# Patient Record
Sex: MALE | Race: WHITE | NOT HISPANIC OR LATINO | Employment: OTHER | URBAN - METROPOLITAN AREA
[De-identification: names, ages, dates, MRNs, and addresses within clinical notes are randomized per-mention and may not be internally consistent; named-entity substitution may affect disease eponyms.]

---

## 2018-01-15 NOTE — MISCELLANEOUS
Message  Pt has CV doppler scheduled threw [facility] on 09/16/2016  Pt states he will give us a call if he feels like he needs to be seen by us  Active Problems    1  Arteriosclerosis of coronary artery (414 00) (I25 10)   2  Denied: History of Brain Tumor (239 6)   3  CABG   4  Carotid artery stenosis (433 10) (I65 29)   5  Carotid artery stenosis (433 10) (I65 29)   6  Complaints of memory disturbance (780 93) (R41 3)   7  Difficulty walking (719 7) (R26 2)   8  Dizziness and giddiness (780 4) (R42)   9  Hyperlipidemia (272 4) (E78 5)   10  Limb pain (729 5) (M79 609)   11  Denied: History of Meningioma (225 2)   12  Mitral regurgitation (424 0) (I34 0)   13  Pre-operative cardiovascular examination (V72 81) (Z01 810)   14  Spells (780 99) (R68 89)   15  Spells of decreased attentiveness (780 99) (R68 89)   16  Spells of speech arrest (784 59) (R47 89)   17  Transient ischemic attack (435 9) (G45 9)    Current Meds   1  Aspirin 81 MG TABS; Take 1 tablet daily Recorded   2  Atorvastatin Calcium 20 MG Oral Tablet; Take 1 tablet daily; Therapy: 21Apr2014 to (Evaluate:04Jun2015)  Requested for: [id number]; Last   Rx:09Jun2014 Ordered    Allergies    1  Keppra TABS   2   Simvastatin TABS    Signatures   Electronically signed by : Omari Bundy, ; Sep 16 2016 11:24AM EST                       (Author)

## 2018-09-24 RX ORDER — ATORVASTATIN CALCIUM 20 MG/1
1 TABLET, FILM COATED ORAL DAILY
COMMUNITY
Start: 2014-04-21 | End: 2018-09-25 | Stop reason: SDUPTHER

## 2018-09-25 ENCOUNTER — TRANSCRIBE ORDERS (OUTPATIENT)
Dept: ADMINISTRATIVE | Facility: HOSPITAL | Age: 63
End: 2018-09-25

## 2018-09-25 ENCOUNTER — OFFICE VISIT (OUTPATIENT)
Dept: CARDIOLOGY CLINIC | Facility: CLINIC | Age: 63
End: 2018-09-25
Payer: MEDICARE

## 2018-09-25 VITALS
DIASTOLIC BLOOD PRESSURE: 64 MMHG | SYSTOLIC BLOOD PRESSURE: 118 MMHG | HEART RATE: 75 BPM | WEIGHT: 221 LBS | HEIGHT: 72 IN | BODY MASS INDEX: 29.93 KG/M2

## 2018-09-25 DIAGNOSIS — I25.10 CORONARY ARTERY DISEASE, ANGINA PRESENCE UNSPECIFIED, UNSPECIFIED VESSEL OR LESION TYPE, UNSPECIFIED WHETHER NATIVE OR TRANSPLANTED HEART: Primary | ICD-10-CM

## 2018-09-25 PROCEDURE — 99214 OFFICE O/P EST MOD 30 MIN: CPT | Performed by: INTERNAL MEDICINE

## 2018-09-25 PROCEDURE — 93000 ELECTROCARDIOGRAM COMPLETE: CPT | Performed by: INTERNAL MEDICINE

## 2018-09-25 RX ORDER — ATORVASTATIN CALCIUM 20 MG/1
20 TABLET, FILM COATED ORAL DAILY
Qty: 90 TABLET | Refills: 3 | Status: SHIPPED | OUTPATIENT
Start: 2018-09-25 | End: 2019-10-31 | Stop reason: ALTCHOICE

## 2018-09-25 RX ORDER — MOMETASONE FUROATE 1 MG/G
CREAM TOPICAL
COMMUNITY
Start: 2018-03-01

## 2018-09-25 RX ORDER — AMOXICILLIN AND CLAVULANATE POTASSIUM 875; 125 MG/1; MG/1
1 TABLET, FILM COATED ORAL
COMMUNITY
Start: 2018-09-19 | End: 2018-09-29

## 2018-09-25 RX ORDER — OXYCODONE HYDROCHLORIDE 5 MG/1
TABLET ORAL
COMMUNITY
Start: 2018-09-19 | End: 2019-10-31 | Stop reason: ALTCHOICE

## 2018-09-25 RX ORDER — ONDANSETRON 4 MG/1
4 TABLET, FILM COATED ORAL EVERY 8 HOURS
COMMUNITY
Start: 2018-09-19 | End: 2019-10-31 | Stop reason: ALTCHOICE

## 2018-09-25 NOTE — PROGRESS NOTES
Cardiology Follow Up    Alicia Berger  1955  [id number]  [address] [facility] Elo Hernandez  [address] [place] [address] [phone]    1  Coronary artery disease, angina presence unspecified, unspecified vessel or lesion type, unspecified whether native or transplanted heart  POCT ECG       Interval History: Followup for CAD and hx of left carotid endarterectomy  His main symptom is fatigue and doesn't do as much as he used to  He has no angina or dyspnea  Recent hospitalization for symptomatic gall stones  He did not have any surgery due to need for preoperative cardiac clearance  No past medical history on file  Social History     Social History    Marital status: /Civil Union     Spouse name: N/A    Number of children: N/A    Years of education: N/A     Occupational History    Not on file  Social History Main Topics    Smoking status: Not on file    Smokeless tobacco: Not on file    Alcohol use Not on file    Drug use: Unknown    Sexual activity: Not on file     Other Topics Concern    Not on file     Social History Narrative    No narrative on file      No family history on file  No past surgical history on file      Current Outpatient Prescriptions:     amoxicillin-clavulanate (AUGMENTIN) 875-125 mg per tablet, Take 1 tablet by mouth, Disp: , Rfl:     mometasone (ELOCON) 0 1 % cream, Apply topically, Disp: , Rfl:     ondansetron (ZOFRAN) 4 mg tablet, Take 4 mg by mouth every 8 (eight) hours, Disp: , Rfl:     oxyCODONE (ROXICODONE) 5 mg immediate release tablet, , Disp: , Rfl:     aspirin 81 MG tablet, Take 1 tablet by mouth daily, Disp: , Rfl:     atorvastatin (LIPITOR) 20 mg tablet, Take 1 tablet by mouth daily, Disp: , Rfl:   Allergies   Allergen Reactions    Levetiracetam Irritability and Myalgia    Simvastatin Myalgia       Labs:     Chemistry        Component Value Date/Time  2014 0905    K 4 9 2014 0905     2014 0905    CO2 28 2014 0905    BUN 14 2014 0905    CREATININE 1 11 2014 0905        Component Value Date/Time    CALCIUM 8 7 2014 0905            Lab Results   Component Value Date    CHOL 281 2014     Lab Results   Component Value Date    HDL 36 2014     Lab Results   Component Value Date    LDLCALC 201 (H) 2014     Lab Results   Component Value Date    TRIG 220 2014     No components found for: CHOLHDL    Imaging: No results found  EKG: NSR  Inferior Infarct  Review of Systems   Constitution: Negative  HENT: Negative  Eyes: Negative  Cardiovascular: Negative  Respiratory: Negative  Endocrine: Negative  Hematologic/Lymphatic: Negative  Skin: Negative  Musculoskeletal: Negative  Gastrointestinal: Negative  Genitourinary: Negative  Neurological: Negative  Psychiatric/Behavioral: Negative  Allergic/Immunologic: Negative  Vitals:    18 1059   BP: 118/64   Pulse: 75           Physical Exam   Constitutional: He is oriented to person, place, and time  No distress  HENT:   Mouth/Throat: No oropharyngeal exudate  Eyes: No scleral icterus  Neck: No JVD present  Cardiovascular: Normal rate and regular rhythm  No murmur heard  Pulmonary/Chest: Effort normal and breath sounds normal  No respiratory distress  He has no wheezes  He has no rales  Abdominal: Soft  Bowel sounds are normal  He exhibits no distension  There is no tenderness  There is no rebound  Musculoskeletal: He exhibits no edema  Neurological: He is alert and oriented to person, place, and time  Skin: Skin is warm and dry  He is not diaphoretic  Psychiatric: He has a normal mood and affect  Discussion/Summary:    Preoperative Cardiac Clearance: Moderate Risk for surgery due to history of CAD (Normal LVEF) and prior carotid endarterectomy  CAD s/p CAB  Continue aspirin and atorvastatin  He will have an elective stress test after his surgery  Patent Left carotid  Endarterectomy  The patient was counseled regarding diagnostic results, instructions for management, risk factor reductions, impressions  total time of encounter was 25 minutes and 15 minutes was spent counseling

## 2018-09-25 NOTE — PATIENT INSTRUCTIONS
1  Recommend Aspirin 81mg daily    2  Recommend Atorvastatin 20mg daily    3  Check labs in six weeks  4  Stress test electively after surgery

## 2019-04-04 DIAGNOSIS — I25.10 CORONARY ARTERY DISEASE INVOLVING NATIVE CORONARY ARTERY, ANGINA PRESENCE UNSPECIFIED, UNSPECIFIED WHETHER NATIVE OR TRANSPLANTED HEART: ICD-10-CM

## 2019-04-04 DIAGNOSIS — I25.10 CORONARY ARTERY DISEASE INVOLVING NATIVE CORONARY ARTERY OF NATIVE HEART WITHOUT ANGINA PECTORIS: Primary | ICD-10-CM

## 2019-04-09 ENCOUNTER — HOSPITAL ENCOUNTER (OUTPATIENT)
Dept: NON INVASIVE DIAGNOSTICS | Facility: CLINIC | Age: 64
Discharge: HOME | End: 2019-04-09
Payer: MEDICARE

## 2019-04-09 DIAGNOSIS — I25.10 CORONARY ARTERY DISEASE INVOLVING NATIVE CORONARY ARTERY OF NATIVE HEART WITHOUT ANGINA PECTORIS: ICD-10-CM

## 2019-04-09 PROCEDURE — 78452 HT MUSCLE IMAGE SPECT MULT: CPT | Performed by: INTERNAL MEDICINE

## 2019-04-09 PROCEDURE — A9502 TC99M TETROFOSMIN: HCPCS

## 2019-04-09 PROCEDURE — 93016 CV STRESS TEST SUPVJ ONLY: CPT | Performed by: INTERNAL MEDICINE

## 2019-04-09 PROCEDURE — 93018 CV STRESS TEST I&R ONLY: CPT | Performed by: INTERNAL MEDICINE

## 2019-04-09 PROCEDURE — 93306 TTE W/DOPPLER COMPLETE: CPT

## 2019-04-09 PROCEDURE — 93017 CV STRESS TEST TRACING ONLY: CPT

## 2019-04-09 PROCEDURE — 78452 HT MUSCLE IMAGE SPECT MULT: CPT

## 2019-04-09 RX ADMIN — REGADENOSON 0.4 MG: 0.08 INJECTION, SOLUTION INTRAVENOUS at 13:55

## 2019-04-10 PROCEDURE — 93306 TTE W/DOPPLER COMPLETE: CPT | Performed by: INTERNAL MEDICINE

## 2019-04-12 LAB
MAX DIASTOLIC BP: 90 MMHG
MAX HEART RATE: 84 BPM
MAX PREDICTED HEART RATE: 154 BPM
MAX. SYSTOLIC BP: 154 MMHG
PROTOCOL NAME: NORMAL
REASON FOR TERMINATION: NORMAL
TARGET HR FORMULA: NORMAL
TIME IN EXERCISE PHASE: NORMAL

## 2019-10-31 ENCOUNTER — OFFICE VISIT (OUTPATIENT)
Dept: CARDIOLOGY CLINIC | Facility: CLINIC | Age: 64
End: 2019-10-31
Payer: MEDICARE

## 2019-10-31 VITALS — SYSTOLIC BLOOD PRESSURE: 138 MMHG | DIASTOLIC BLOOD PRESSURE: 72 MMHG | HEART RATE: 73 BPM

## 2019-10-31 DIAGNOSIS — I25.10 CORONARY ARTERY DISEASE INVOLVING NATIVE CORONARY ARTERY OF NATIVE HEART WITHOUT ANGINA PECTORIS: ICD-10-CM

## 2019-10-31 DIAGNOSIS — R07.9 CHEST PAIN, UNSPECIFIED TYPE: Primary | ICD-10-CM

## 2019-10-31 DIAGNOSIS — I25.10 CORONARY ARTERY DISEASE, ANGINA PRESENCE UNSPECIFIED, UNSPECIFIED VESSEL OR LESION TYPE, UNSPECIFIED WHETHER NATIVE OR TRANSPLANTED HEART: ICD-10-CM

## 2019-10-31 PROCEDURE — 93000 ELECTROCARDIOGRAM COMPLETE: CPT | Performed by: INTERNAL MEDICINE

## 2019-10-31 PROCEDURE — 99214 OFFICE O/P EST MOD 30 MIN: CPT | Performed by: INTERNAL MEDICINE

## 2019-10-31 RX ORDER — ATORVASTATIN CALCIUM 20 MG/1
20 TABLET, FILM COATED ORAL DAILY
Qty: 90 TABLET | Refills: 3 | Status: SHIPPED | OUTPATIENT
Start: 2019-10-31

## 2019-10-31 NOTE — PROGRESS NOTES
Cardiology Follow Up    Isabel You  1955  [id number]  [address] [facility] Christian Child  [address] [place] [id number] [phone]    1  Chest pain, unspecified type  POCT ECG       Interval History: Followup for CAD    He has intermittent chest pain that is unchanged  He has sharp headaches and associated sharp chest pain  No past medical history on file  Social History     Socioeconomic History    Marital status: /Civil Union     Spouse name: Not on file    Number of children: Not on file    Years of education: Not on file    Highest education level: Not on file   Occupational History    Not on file   Social Needs    Financial resource strain: Not on file    Food insecurity:     Worry: Not on file     Inability: Not on file    Transportation needs:     Medical: Not on file     Non-medical: Not on file   Tobacco Use    Smoking status: Not on file   Substance and Sexual Activity    Alcohol use: Not on file    Drug use: Not on file    Sexual activity: Not on file   Lifestyle    Physical activity:     Days per week: Not on file     Minutes per session: Not on file    Stress: Not on file   Relationships    Social connections:     Talks on phone: Not on file     Gets together: Not on file     Attends [employer] service: Not on file     Active member of club or organization: Not on file     Attends meetings of clubs or organizations: Not on file     Relationship status: Not on file    Intimate partner violence:     Fear of current or ex partner: Not on file     Emotionally abused: Not on file     Physically abused: Not on file     Forced sexual activity: Not on file   Other Topics Concern    Not on file   Social History Narrative    Not on file      No family history on file  No past surgical history on file      Current Outpatient Medications:     mometasone (ELOCON) 0 1 % cream, Apply topically, Disp: , Rfl: Allergies   Allergen Reactions    Levetiracetam Irritability and Myalgia    Simvastatin Myalgia       Labs:     Chemistry        Component Value Date/Time     04/29/2014 0905    K 4 9 04/29/2014 0905     04/29/2014 0905    CO2 28 04/29/2014 0905    BUN 14 04/29/2014 0905    CREATININE 1 11 04/29/2014 0905        Component Value Date/Time    CALCIUM 8 7 04/29/2014 0905            Lab Results   Component Value Date    CHOL 281 04/04/2014     Lab Results   Component Value Date    HDL 36 04/04/2014     Lab Results   Component Value Date    LDLCALC 201 (H) 04/04/2014     Lab Results   Component Value Date    TRIG 220 04/04/2014     No results found for: CHOLHDL    Imaging: No results found  EKG: NSR  Normal ECG  Review of Systems   Constitution: Negative  HENT: Negative  Eyes: Negative  Cardiovascular: Positive for chest pain  Respiratory: Negative  Endocrine: Negative  Hematologic/Lymphatic: Negative  Skin: Negative  Musculoskeletal: Negative  Gastrointestinal: Negative  Genitourinary: Negative  Neurological: Positive for headaches  Psychiatric/Behavioral: Negative  Allergic/Immunologic: Negative  Vitals:    10/31/19 1304   BP: 138/72   Pulse: 73           Physical Exam   Constitutional: He is oriented to person, place, and time  No distress  HENT:   Mouth/Throat: No oropharyngeal exudate  Eyes: No scleral icterus  Neck: No JVD present  Cardiovascular: Normal rate and regular rhythm  Exam reveals no friction rub  No murmur heard  Pulmonary/Chest: Effort normal and breath sounds normal  No stridor  No respiratory distress  He has no wheezes  Abdominal: Soft  Bowel sounds are normal  He exhibits no distension  There is no tenderness  There is no guarding  Musculoskeletal: He exhibits no edema  Neurological: He is alert and oriented to person, place, and time  Skin: Skin is warm and dry  He is not diaphoretic     Psychiatric: He has a normal mood and affect  His behavior is normal        Discussion/Summary:    Coronary Artery Disease:  He has chronic chest pain that has no clear exacerbating factors  He stopped his meds on his own  He is agreeable to restart his medical therapy  Some of his symptoms sound vasovagal with his lightheadedness and headaches  Recommend to drink a lot more water during the day  The patient was counseled regarding diagnostic results, instructions for management, risk factor reductions, impressions  total time of encounter was 25 minutes and 15 minutes was spent counseling

## 2022-02-18 ENCOUNTER — OFFICE VISIT (OUTPATIENT)
Dept: CARDIOLOGY CLINIC | Facility: CLINIC | Age: 67
End: 2022-02-18
Payer: MEDICARE

## 2022-02-18 VITALS
WEIGHT: 227.4 LBS | DIASTOLIC BLOOD PRESSURE: 60 MMHG | BODY MASS INDEX: 30.8 KG/M2 | HEIGHT: 72 IN | HEART RATE: 71 BPM | SYSTOLIC BLOOD PRESSURE: 142 MMHG

## 2022-02-18 DIAGNOSIS — I25.10 CORONARY ARTERY DISEASE INVOLVING NATIVE CORONARY ARTERY OF NATIVE HEART WITHOUT ANGINA PECTORIS: ICD-10-CM

## 2022-02-18 DIAGNOSIS — R00.2 PALPITATION: ICD-10-CM

## 2022-02-18 DIAGNOSIS — R07.9 CHEST PAIN, UNSPECIFIED TYPE: ICD-10-CM

## 2022-02-18 DIAGNOSIS — I10 HYPERTENSION, UNSPECIFIED TYPE: Primary | ICD-10-CM

## 2022-02-18 PROCEDURE — 99214 OFFICE O/P EST MOD 30 MIN: CPT | Performed by: INTERNAL MEDICINE

## 2022-02-18 PROCEDURE — 93000 ELECTROCARDIOGRAM COMPLETE: CPT | Performed by: INTERNAL MEDICINE

## 2022-02-18 RX ORDER — DULOXETIN HYDROCHLORIDE 20 MG/1
20 CAPSULE, DELAYED RELEASE ORAL DAILY
COMMUNITY

## 2022-02-18 NOTE — PROGRESS NOTES
Cardiology Follow Up    Adrien Meredith  1955  [id number]  [facility] Jessica Burris  [address] [place] [address]  [phone]    1  Hypertension, unspecified type  POCT ECG   2  Palpitation     3  Chest pain, unspecified type  POCT ECG       Interval History: Followup cad and chest pain    Wes Cockayne continues to have very persistent chest pain  Sharp, substernal, and times becomes very severe  He has intermittent headaches as well  No exertional component to the chest pain  Medical Problems                 No past medical history on file  Social History     Socioeconomic History    Marital status: /Civil Union     Spouse name: Not on file    Number of children: Not on file    Years of education: Not on file    Highest education level: Not on file   Occupational History    Not on file   Tobacco Use    Smoking status: Not on file    Smokeless tobacco: Not on file   Substance and Sexual Activity    Alcohol use: Not on file    Drug use: Not on file    Sexual activity: Not on file   Other Topics Concern    Not on file   Social History Narrative    Not on file     Social Determinants of Health     Financial Resource Strain: Not on file   Food Insecurity: Not on file   Transportation Needs: Not on file   Physical Activity: Not on file   Stress: Not on file   Social Connections: Not on file   Intimate Partner Violence: Not on file   Housing Stability: Not on file      No family history on file  No past surgical history on file      Current Outpatient Medications:     aspirin 81 MG tablet, Take 1 tablet (81 mg total) by mouth daily, Disp: 30 tablet, Rfl: 5    atorvastatin (LIPITOR) 20 mg tablet, Take 1 tablet (20 mg total) by mouth daily (Patient taking differently: Take 10 mg by mouth daily Taking twice a week ), Disp: 90 tablet, Rfl: 3    DULoxetine (CYMBALTA) 20 mg capsule, Take 20 mg by mouth daily, Disp: , Rfl:     mometasone (ELOCON) 0 1 % cream, Apply topically, Disp: , Rfl:     co-enzyme Q-10 50 MG capsule, Take 2 capsules (100 mg total) by mouth daily (Patient not taking: Reported on 2/18/2022 ), Disp: , Rfl:   Allergies   Allergen Reactions    Levetiracetam Irritability and Myalgia    Simvastatin Myalgia       Labs:     Chemistry        Component Value Date/Time     04/29/2014 0905    K 4 9 04/29/2014 0905     04/29/2014 0905    CO2 28 04/29/2014 0905    BUN 14 04/29/2014 0905    CREATININE 1 11 04/29/2014 0905        Component Value Date/Time    CALCIUM 8 7 04/29/2014 0905            Lab Results   Component Value Date    CHOL 281 04/04/2014     Lab Results   Component Value Date    HDL 36 04/04/2014     Lab Results   Component Value Date    LDLCALC 201 (H) 04/04/2014     Lab Results   Component Value Date    TRIG 220 04/04/2014     No results found for: CHOLHDL    Imaging: No results found  Review of Systems   Constitutional: Negative  HENT: Negative  Eyes: Negative  Cardiovascular: Positive for chest pain  Respiratory: Negative  Endocrine: Negative  Hematologic/Lymphatic: Negative  Skin: Negative  Musculoskeletal: Negative  Gastrointestinal: Negative  Genitourinary: Negative  Neurological: Negative  Psychiatric/Behavioral: Negative  Allergic/Immunologic: Negative  Vitals:    02/18/22 1133   BP: 142/60   Pulse: 71           Physical Exam  Vitals reviewed  Constitutional:       Appearance: Normal appearance  HENT:      Head: Normocephalic  Nose: Nose normal       Mouth/Throat:      Mouth: Mucous membranes are moist       Pharynx: Oropharynx is clear  Eyes:      General: No scleral icterus  Conjunctiva/sclera: Conjunctivae normal    Cardiovascular:      Rate and Rhythm: Normal rate and regular rhythm  Heart sounds: No murmur heard  No friction rub  No gallop      Pulmonary:      Effort: Pulmonary effort is normal  No respiratory distress  Breath sounds: Normal breath sounds  No wheezing or rales  Abdominal:      General: Abdomen is flat  Bowel sounds are normal  There is no distension  Palpations: Abdomen is soft  Tenderness: There is no abdominal tenderness  There is no guarding  Musculoskeletal:      Cervical back: Normal range of motion and neck supple  Right lower leg: No edema  Left lower leg: No edema  Skin:     General: Skin is warm and dry  Neurological:      General: No focal deficit present  Mental Status: He is alert and oriented to person, place, and time  Psychiatric:         Mood and Affect: Mood normal          Behavior: Behavior normal          Discussion/Summary:    Coronary Artery Disease: S/P CABG 2014    S/P left carotid endarterectomy  ? Etiology of his chest pain  Check chest CT for further evaluation, also evaluate pericardial constriction  Continue with current med rx for CAD  The patient was counseled regarding diagnostic results, instructions for management, risk factor reductions, impressions  total time of encounter was 25 minutes and 15 minutes was spent counseling

## 2022-02-21 ENCOUNTER — HOSPITAL ENCOUNTER (OUTPATIENT)
Dept: NON INVASIVE DIAGNOSTICS | Age: 67
Discharge: HOME | End: 2022-02-21
Payer: MEDICARE

## 2022-02-21 VITALS
HEIGHT: 72 IN | BODY MASS INDEX: 30.75 KG/M2 | DIASTOLIC BLOOD PRESSURE: 60 MMHG | SYSTOLIC BLOOD PRESSURE: 142 MMHG | WEIGHT: 227 LBS

## 2022-02-21 DIAGNOSIS — I25.10 CORONARY ARTERY DISEASE INVOLVING NATIVE CORONARY ARTERY OF NATIVE HEART WITHOUT ANGINA PECTORIS: ICD-10-CM

## 2022-02-21 DIAGNOSIS — R07.9 CHEST PAIN, UNSPECIFIED TYPE: ICD-10-CM

## 2022-02-21 LAB
AORTIC ROOT: 3.3 CM
APICAL FOUR CHAMBER EJECTION FRACTION: 65 %
E WAVE DECELERATION TIME: 302 MS
FRACTIONAL SHORTENING: 35 % (ref 28–44)
INTERVENTRICULAR SEPTUM IN DIASTOLE (PARASTERNAL SHORT AXIS VIEW): 1 CM (ref 0.56–1.06)
INTERVENTRICULAR SEPTUM: 1 CM (ref 0.6–1.1)
LAAS-AP2: 13.8 CM2
LAAS-AP4: 13.7 CM2
LEFT ATRIUM AREA SYSTOLE SINGLE PLANE A4C: 13.2 CM2
LEFT ATRIUM SIZE: 4 CM
LEFT INTERNAL DIMENSION IN SYSTOLE: 3.1 CM (ref 4.45–6.74)
LEFT VENTRICLE DIASTOLIC VOLUME (MOD BIPLANE): 141 ML (ref 113.26–255.08)
LEFT VENTRICLE SYSTOLIC VOLUME (MOD BIPLANE): 54 ML
LEFT VENTRICULAR INTERNAL DIMENSION IN DIASTOLE: 4.8 CM (ref 7.44–11.09)
LEFT VENTRICULAR POSTERIOR WALL IN END DIASTOLE: 1 CM (ref 0.55–1.04)
LEFT VENTRICULAR STROKE VOLUME: 72 ML
LV EF: 61 %
LVSV (TEICH): 72 ML
MV E'TISSUE VEL-SEP: 10 CM/S
MV PEAK A VEL: 1.56 M/S
MV PEAK E VEL: 134 CM/S
MV STENOSIS PRESSURE HALF TIME: 87 MS
MV VALVE AREA P 1/2 METHOD: 2.53 CM2
RIGHT ATRIUM AREA SYSTOLE A4C: 14.1 CM2
RIGHT VENTRICLE ID DIMENSION: 2.7 CM
SL CV LEFT ATRIUM LENGTH A2C: 5.3 CM
SL CV LV DIAS VOL ENDO Z SCORE: -1.22
SL CV LV EF: 55
SL CV PED ECHO LEFT VENTRICLE DIASTOLIC VOLUME (MOD BIPLANE) 2D: 109 ML
SL CV PED ECHO LEFT VENTRICLE SYSTOLIC VOLUME (MOD BIPLANE) 2D: 37 ML
TR MAX PG: 18 MMHG
TR PEAK VELOCITY: 2.1 M/S
TRICUSPID VALVE PEAK REGURGITATION VELOCITY: 2.12 M/S
Z-SCORE OF INTERVENTRICULAR SEPTUM IN END DIASTOLE: 1.5
Z-SCORE OF LEFT VENTRICULAR DIMENSION IN END DIASTOLE: -6.38
Z-SCORE OF LEFT VENTRICULAR DIMENSION IN END SYSTOLE: -4.5
Z-SCORE OF LEFT VENTRICULAR POSTERIOR WALL IN END DIASTOLE: 1.62

## 2022-02-21 PROCEDURE — 93306 TTE W/DOPPLER COMPLETE: CPT

## 2022-02-21 PROCEDURE — 93306 TTE W/DOPPLER COMPLETE: CPT | Performed by: INTERNAL MEDICINE

## 2022-02-22 ENCOUNTER — HOSPITAL ENCOUNTER (OUTPATIENT)
Dept: CT IMAGING | Facility: HOSPITAL | Age: 67
Discharge: HOME | End: 2022-02-22
Attending: INTERNAL MEDICINE
Payer: MEDICARE

## 2022-02-22 DIAGNOSIS — R07.9 CHEST PAIN, UNSPECIFIED TYPE: ICD-10-CM

## 2022-02-22 DIAGNOSIS — I25.10 CORONARY ARTERY DISEASE INVOLVING NATIVE CORONARY ARTERY OF NATIVE HEART WITHOUT ANGINA PECTORIS: ICD-10-CM

## 2022-02-22 PROCEDURE — 71250 CT THORAX DX C-: CPT

## 2022-02-24 ENCOUNTER — TELEPHONE (OUTPATIENT)
Dept: CARDIOLOGY CLINIC | Facility: CLINIC | Age: 67
End: 2022-02-24

## 2022-02-24 NOTE — TELEPHONE ENCOUNTER
----- Message from Wan Llanes MD sent at 2/23/2022  3:12 PM EST -----  No significant pericardial calcification on CT

## 2022-02-24 NOTE — TELEPHONE ENCOUNTER
Called spoke to pt, relayed message as given, pt verbalized understanding  ECHO results: Looks good   No changes

## 2023-06-14 ENCOUNTER — OFFICE VISIT (OUTPATIENT)
Dept: CARDIOLOGY CLINIC | Facility: CLINIC | Age: 68
End: 2023-06-14
Payer: MEDICARE

## 2023-06-14 VITALS — SYSTOLIC BLOOD PRESSURE: 140 MMHG | DIASTOLIC BLOOD PRESSURE: 72 MMHG | BODY MASS INDEX: 30.14 KG/M2 | WEIGHT: 222.2 LBS

## 2023-06-14 DIAGNOSIS — I10 HYPERTENSION, UNSPECIFIED TYPE: Primary | ICD-10-CM

## 2023-06-14 DIAGNOSIS — R06.02 SHORTNESS OF BREATH: ICD-10-CM

## 2023-06-14 DIAGNOSIS — R06.00 DYSPNEA, UNSPECIFIED TYPE: ICD-10-CM

## 2023-06-14 DIAGNOSIS — I25.10 CORONARY ARTERY DISEASE INVOLVING NATIVE CORONARY ARTERY OF NATIVE HEART WITHOUT ANGINA PECTORIS: ICD-10-CM

## 2023-06-14 PROCEDURE — 93000 ELECTROCARDIOGRAM COMPLETE: CPT | Performed by: INTERNAL MEDICINE

## 2023-06-14 PROCEDURE — 99214 OFFICE O/P EST MOD 30 MIN: CPT | Performed by: INTERNAL MEDICINE

## 2023-06-14 NOTE — PROGRESS NOTES
Cardiology Follow Up    Beryle Gala  1955  [id number]  [address] [facility] Daisy Parra  [address] [place] [id number] [phone]    1  Hypertension, unspecified type  POCT ECG          Interval History: Followup cad    Patient has been having increasing degrees of chest pain which is similar to his symptoms in the past  Unclear etiology  He has noticed increasing in dyspnea which was his symptom prior to cabg  Medical Problems      No past medical history on file  Social History     Socioeconomic History   • Marital status: /Civil Union     Spouse name: Not on file   • Number of children: Not on file   • Years of education: Not on file   • Highest education level: Not on file   Occupational History   • Not on file   Tobacco Use   • Smoking status: Not on file   • Smokeless tobacco: Not on file   Substance and Sexual Activity   • Alcohol use: Not on file   • Drug use: Not on file   • Sexual activity: Not on file   Other Topics Concern   • Not on file   Social History Narrative   • Not on file     Social Determinants of Health     Financial Resource Strain: Not on file   Food Insecurity: Not on file   Transportation Needs: Not on file   Physical Activity: Not on file   Stress: Not on file   Social Connections: Not on file   Intimate Partner Violence: Not on file   Housing Stability: Not on file      No family history on file  No past surgical history on file      Current Outpatient Medications:   •  aspirin 81 MG tablet, Take 1 tablet (81 mg total) by mouth daily, Disp: 30 tablet, Rfl: 5  •  atorvastatin (LIPITOR) 20 mg tablet, Take 1 tablet (20 mg total) by mouth daily (Patient taking differently: Take 10 mg by mouth daily Taking twice a week), Disp: 90 tablet, Rfl: 3  •  mometasone (ELOCON) 0 1 % cream, Apply topically, Disp: , Rfl:   •  nitroglycerin (NITROSTAT) 0 4 mg SL tablet, Place 1 tablet (0 4 mg total) "under the tongue every 5 (five) minutes as needed for chest pain, Disp: 30 tablet, Rfl: 0  •  co-enzyme Q-10 50 MG capsule, Take 2 capsules (100 mg total) by mouth daily (Patient not taking: Reported on 2/18/2022), Disp: , Rfl:   •  DULoxetine (CYMBALTA) 20 mg capsule, Take 20 mg by mouth daily (Patient not taking: Reported on 6/14/2023), Disp: , Rfl:   Allergies   Allergen Reactions   • Levetiracetam Irritability and Myalgia   • Simvastatin Myalgia       Labs:     Chemistry        Component Value Date/Time    BUN 14 04/29/2014 0905     04/29/2014 0905    CO2 28 04/29/2014 0905    CREATININE 1 11 04/29/2014 0905    K 4 9 04/29/2014 0905     04/29/2014 0905        Component Value Date/Time    CALCIUM 8 7 04/29/2014 0905            Lab Results   Component Value Date    CHOL 281 04/04/2014     Lab Results   Component Value Date    HDL 36 04/04/2014     Lab Results   Component Value Date    LDLCALC 201 (H) 04/04/2014     Lab Results   Component Value Date    TRIG 220 04/04/2014     No results found for: \"CHOLHDL\"    Imaging: No results found  Review of Systems   Constitutional: Negative  HENT: Negative  Eyes: Negative  Cardiovascular: Positive for chest pain  Respiratory: Positive for shortness of breath  Endocrine: Negative  Hematologic/Lymphatic: Negative  Skin: Negative  Musculoskeletal: Negative  Gastrointestinal: Negative  Genitourinary: Negative  Neurological: Negative  Psychiatric/Behavioral: Negative  Allergic/Immunologic: Negative  Vitals:    06/14/23 1025   BP: 140/72           Physical Exam  Vitals reviewed  Constitutional:       Appearance: Normal appearance  HENT:      Head: Normocephalic  Nose: Nose normal       Mouth/Throat:      Mouth: Mucous membranes are moist       Pharynx: Oropharynx is clear  Eyes:      General: No scleral icterus       Conjunctiva/sclera: Conjunctivae normal    Cardiovascular:      Rate and Rhythm: Normal rate " and regular rhythm  Heart sounds: No murmur heard  No friction rub  No gallop  Pulmonary:      Effort: Pulmonary effort is normal  No respiratory distress  Breath sounds: Normal breath sounds  No wheezing or rales  Abdominal:      General: Abdomen is flat  Bowel sounds are normal  There is no distension  Palpations: Abdomen is soft  Tenderness: There is no abdominal tenderness  There is no guarding  Musculoskeletal:      Cervical back: Normal range of motion and neck supple  Right lower leg: No edema  Left lower leg: No edema  Skin:     General: Skin is warm and dry  Neurological:      General: No focal deficit present  Mental Status: He is alert and oriented to person, place, and time  Psychiatric:         Mood and Affect: Mood normal          Behavior: Behavior normal          Discussion/Summary:    Coronary Artery Disease: S/P CABG 2014     S/P left carotid endarterectomy       Check lexiscan MPI for increasing dyspnea  Unclear etiology of his chest discomfort       Continue with current med rx for CAD  The patient was counseled regarding diagnostic results, instructions for management, risk factor reductions, impressions  total time of encounter was 25 minutes and 15 minutes was spent counseling

## 2025-01-01 ENCOUNTER — HOME CARE VISIT (OUTPATIENT)
Dept: HOME HEALTH SERVICES | Facility: HOME HEALTHCARE | Age: 70
End: 2025-01-01
Payer: MEDICARE

## 2025-01-01 ENCOUNTER — HOME CARE VISIT (OUTPATIENT)
Dept: HOME HOSPICE | Facility: HOSPICE | Age: 70
End: 2025-01-01
Payer: MEDICARE

## 2025-01-01 ENCOUNTER — TELEPHONE (OUTPATIENT)
Dept: OTHER | Facility: OTHER | Age: 70
End: 2025-01-01

## 2025-01-01 PROCEDURE — G0299 HHS/HOSPICE OF RN EA 15 MIN: HCPCS

## 2025-03-11 ENCOUNTER — APPOINTMENT (EMERGENCY)
Dept: CT IMAGING | Facility: HOSPITAL | Age: 70
DRG: 825 | End: 2025-03-11
Payer: MEDICARE

## 2025-03-11 ENCOUNTER — APPOINTMENT (OUTPATIENT)
Dept: RADIOLOGY | Facility: HOSPITAL | Age: 70
DRG: 825 | End: 2025-03-11
Payer: MEDICARE

## 2025-03-11 ENCOUNTER — HOSPITAL ENCOUNTER (INPATIENT)
Facility: HOSPITAL | Age: 70
LOS: 1 days | Discharge: HOME | DRG: 825 | End: 2025-03-12
Attending: EMERGENCY MEDICINE | Admitting: INTERNAL MEDICINE
Payer: MEDICARE

## 2025-03-11 DIAGNOSIS — K14.8 TONGUE LESION: ICD-10-CM

## 2025-03-11 DIAGNOSIS — R59.0 CERVICAL LYMPHADENOPATHY: Primary | ICD-10-CM

## 2025-03-11 DIAGNOSIS — R51.9 HEADACHE: ICD-10-CM

## 2025-03-11 PROBLEM — F41.1 GAD (GENERALIZED ANXIETY DISORDER): Status: ACTIVE | Noted: 2025-03-11

## 2025-03-11 PROBLEM — I25.118 CORONARY ARTERY DISEASE OF NATIVE ARTERY OF NATIVE HEART WITH STABLE ANGINA PECTORIS (HCC): Status: ACTIVE | Noted: 2025-03-11

## 2025-03-11 PROBLEM — E78.5 HYPERLIPIDEMIA: Status: ACTIVE | Noted: 2025-03-11

## 2025-03-11 PROBLEM — Z95.1 HX OF CABG: Status: ACTIVE | Noted: 2025-03-11

## 2025-03-11 PROBLEM — M54.2 NECK PAIN: Status: ACTIVE | Noted: 2025-03-11

## 2025-03-11 LAB
2HR DELTA HS TROPONIN: >1 NG/L
ALBUMIN SERPL BCG-MCNC: 4.5 G/DL (ref 3.5–5)
ALP SERPL-CCNC: 101 U/L (ref 34–104)
ALT SERPL W P-5'-P-CCNC: 12 U/L (ref 7–52)
ANION GAP SERPL CALCULATED.3IONS-SCNC: 8 MMOL/L (ref 4–13)
AST SERPL W P-5'-P-CCNC: 14 U/L (ref 13–39)
ATRIAL RATE: 86 BPM
BASOPHILS # BLD AUTO: 0.06 THOUSANDS/ÂΜL (ref 0–0.1)
BASOPHILS NFR BLD AUTO: 1 % (ref 0–1)
BILIRUB SERPL-MCNC: 0.48 MG/DL (ref 0.2–1)
BUN SERPL-MCNC: 18 MG/DL (ref 5–25)
CALCIUM SERPL-MCNC: 9.3 MG/DL (ref 8.4–10.2)
CARDIAC TROPONIN I PNL SERPL HS: 3 NG/L (ref ?–50)
CARDIAC TROPONIN I PNL SERPL HS: <2 NG/L (ref ?–50)
CHLORIDE SERPL-SCNC: 103 MMOL/L (ref 96–108)
CO2 SERPL-SCNC: 26 MMOL/L (ref 21–32)
CREAT SERPL-MCNC: 0.93 MG/DL (ref 0.6–1.3)
CRP SERPL QL: 17.6 MG/L
EOSINOPHIL # BLD AUTO: 0.17 THOUSAND/ÂΜL (ref 0–0.61)
EOSINOPHIL NFR BLD AUTO: 2 % (ref 0–6)
ERYTHROCYTE [DISTWIDTH] IN BLOOD BY AUTOMATED COUNT: 13 % (ref 11.6–15.1)
ERYTHROCYTE [SEDIMENTATION RATE] IN BLOOD: 17 MM/HOUR (ref 0–19)
GFR SERPL CREATININE-BSD FRML MDRD: 82 ML/MIN/1.73SQ M
GLUCOSE SERPL-MCNC: 120 MG/DL (ref 65–140)
HCT VFR BLD AUTO: 44.8 % (ref 36.5–49.3)
HGB BLD-MCNC: 15.1 G/DL (ref 12–17)
IMM GRANULOCYTES # BLD AUTO: 0.02 THOUSAND/UL (ref 0–0.2)
IMM GRANULOCYTES NFR BLD AUTO: 0 % (ref 0–2)
LYMPHOCYTES # BLD AUTO: 1.65 THOUSANDS/ÂΜL (ref 0.6–4.47)
LYMPHOCYTES NFR BLD AUTO: 22 % (ref 14–44)
MCH RBC QN AUTO: 30.8 PG (ref 26.8–34.3)
MCHC RBC AUTO-ENTMCNC: 33.7 G/DL (ref 31.4–37.4)
MCV RBC AUTO: 91 FL (ref 82–98)
MONOCYTES # BLD AUTO: 0.87 THOUSAND/ÂΜL (ref 0.17–1.22)
MONOCYTES NFR BLD AUTO: 11 % (ref 4–12)
NEUTROPHILS # BLD AUTO: 4.91 THOUSANDS/ÂΜL (ref 1.85–7.62)
NEUTS SEG NFR BLD AUTO: 64 % (ref 43–75)
NRBC BLD AUTO-RTO: 0 /100 WBCS
P AXIS: 66 DEGREES
PLATELET # BLD AUTO: 206 THOUSANDS/UL (ref 149–390)
PMV BLD AUTO: 9.7 FL (ref 8.9–12.7)
POTASSIUM SERPL-SCNC: 4.3 MMOL/L (ref 3.5–5.3)
PR INTERVAL: 188 MS
PROT SERPL-MCNC: 7.6 G/DL (ref 6.4–8.4)
QRS AXIS: 1 DEGREES
QRSD INTERVAL: 78 MS
QT INTERVAL: 372 MS
QTC INTERVAL: 445 MS
RBC # BLD AUTO: 4.9 MILLION/UL (ref 3.88–5.62)
SODIUM SERPL-SCNC: 137 MMOL/L (ref 135–147)
T WAVE AXIS: 70 DEGREES
VENTRICULAR RATE: 86 BPM
WBC # BLD AUTO: 7.68 THOUSAND/UL (ref 4.31–10.16)

## 2025-03-11 PROCEDURE — 80053 COMPREHEN METABOLIC PANEL: CPT | Performed by: EMERGENCY MEDICINE

## 2025-03-11 PROCEDURE — 36415 COLL VENOUS BLD VENIPUNCTURE: CPT

## 2025-03-11 PROCEDURE — 70498 CT ANGIOGRAPHY NECK: CPT

## 2025-03-11 PROCEDURE — 84484 ASSAY OF TROPONIN QUANT: CPT | Performed by: EMERGENCY MEDICINE

## 2025-03-11 PROCEDURE — 85652 RBC SED RATE AUTOMATED: CPT | Performed by: PHYSICIAN ASSISTANT

## 2025-03-11 PROCEDURE — 99285 EMERGENCY DEPT VISIT HI MDM: CPT

## 2025-03-11 PROCEDURE — 86140 C-REACTIVE PROTEIN: CPT | Performed by: PHYSICIAN ASSISTANT

## 2025-03-11 PROCEDURE — 85025 COMPLETE CBC W/AUTO DIFF WBC: CPT | Performed by: EMERGENCY MEDICINE

## 2025-03-11 PROCEDURE — 99285 EMERGENCY DEPT VISIT HI MDM: CPT | Performed by: PHYSICIAN ASSISTANT

## 2025-03-11 PROCEDURE — 93005 ELECTROCARDIOGRAM TRACING: CPT

## 2025-03-11 PROCEDURE — 71046 X-RAY EXAM CHEST 2 VIEWS: CPT

## 2025-03-11 PROCEDURE — 93010 ELECTROCARDIOGRAM REPORT: CPT | Performed by: INTERNAL MEDICINE

## 2025-03-11 PROCEDURE — 99223 1ST HOSP IP/OBS HIGH 75: CPT | Performed by: INTERNAL MEDICINE

## 2025-03-11 PROCEDURE — 70496 CT ANGIOGRAPHY HEAD: CPT

## 2025-03-11 RX ORDER — ACETAMINOPHEN 325 MG/1
650 TABLET ORAL EVERY 6 HOURS PRN
Status: DISCONTINUED | OUTPATIENT
Start: 2025-03-11 | End: 2025-03-12 | Stop reason: HOSPADM

## 2025-03-11 RX ORDER — CARBAMAZEPINE 200 MG/1
200 TABLET ORAL ONCE
Status: COMPLETED | OUTPATIENT
Start: 2025-03-11 | End: 2025-03-11

## 2025-03-11 RX ORDER — ACETAMINOPHEN 325 MG/1
650 TABLET ORAL EVERY 6 HOURS PRN
Status: DISCONTINUED | OUTPATIENT
Start: 2025-03-11 | End: 2025-03-11

## 2025-03-11 RX ORDER — ENOXAPARIN SODIUM 100 MG/ML
40 INJECTION SUBCUTANEOUS DAILY
Status: DISCONTINUED | OUTPATIENT
Start: 2025-03-12 | End: 2025-03-12 | Stop reason: HOSPADM

## 2025-03-11 RX ORDER — CARBAMAZEPINE 200 MG/1
200 TABLET ORAL 3 TIMES DAILY
Qty: 42 TABLET | Refills: 0 | Status: CANCELLED | OUTPATIENT
Start: 2025-03-11 | End: 2025-03-25

## 2025-03-11 RX ORDER — NITROGLYCERIN 0.4 MG/1
0.4 TABLET SUBLINGUAL
Status: DISCONTINUED | OUTPATIENT
Start: 2025-03-11 | End: 2025-03-12 | Stop reason: HOSPADM

## 2025-03-11 RX ORDER — ASPIRIN 81 MG/1
81 TABLET ORAL DAILY
Status: DISCONTINUED | OUTPATIENT
Start: 2025-03-12 | End: 2025-03-12 | Stop reason: HOSPADM

## 2025-03-11 RX ORDER — ASPIRIN 81 MG/1
81 TABLET ORAL DAILY
Status: CANCELLED | OUTPATIENT
Start: 2025-03-12

## 2025-03-11 RX ORDER — KETOROLAC TROMETHAMINE 30 MG/ML
30 INJECTION, SOLUTION INTRAMUSCULAR; INTRAVENOUS EVERY 6 HOURS PRN
Status: DISCONTINUED | OUTPATIENT
Start: 2025-03-11 | End: 2025-03-12 | Stop reason: HOSPADM

## 2025-03-11 RX ORDER — KETOROLAC TROMETHAMINE 30 MG/ML
30 INJECTION, SOLUTION INTRAMUSCULAR; INTRAVENOUS EVERY 6 HOURS PRN
Status: DISCONTINUED | OUTPATIENT
Start: 2025-03-11 | End: 2025-03-11

## 2025-03-11 RX ADMIN — CARBAMAZEPINE 200 MG: 200 TABLET ORAL at 18:54

## 2025-03-11 RX ADMIN — KETOROLAC TROMETHAMINE 30 MG: 30 INJECTION, SOLUTION INTRAMUSCULAR; INTRAVENOUS at 19:43

## 2025-03-11 RX ADMIN — IOHEXOL 85 ML: 350 INJECTION, SOLUTION INTRAVENOUS at 17:08

## 2025-03-11 NOTE — H&P
H&P - Hospitalist   Name: Bryant Meza 69 y.o. male I MRN: [id number]  Unit/Bed#: ED-11 I Date of Admission: 3/11/2025   Date of Service: 3/11/2025 I Hospital Day: 0     Assessment & Plan  Neck pain  VSS, CMP and CBC WNL.  CTA notable for multiple enlarged cervical lymph nodes, largest lymph node in right measuring 1.7 cm and appears centrally necrotic, worrisome for hector metastases, and nonspecific contrast-enhancement at base of tongue, particularly on right.  On exam, patient has significant tenderness of left lateral neck.  Patient is frustrated with lack of diagnosis throughout the past years.  Possible malignancy of tongue versus other primary malignancy, such as lung due to smoking history.    Plan:  Consult ENT and IR, excisional biopsy versus biopsy of lymph node.  Patient is very adamant on biopsy on 3/12.  N.p.o. overnight.  Hold DVT prophylaxis.  Trial carbamazepine x 1.  Added Toradol 30 mg IV every 6 hours as needed for head pain.  Consider migraine cocktail if head pain not adequately controlled.  Headache  See under neck pain.  Hx of CABG  Currently not on any medications.  Patient is willing to restart aspirin at this time, but will currently hold for possible biopsy on 3/12.  Hyperlipidemia  Currently not on any medications.  Patient unwilling to restart atorvastatin at this time.  Coronary artery disease of native artery of native heart with stable angina pectoris (HCC)  2/22 echo: LVEF 55%, normal systolic function, G1 DD.   Cervical lymphadenopathy  See under neck pain.      VTE Pharmacologic Prophylaxis: VTE Score: 4 Moderate Risk (Score 3-4) - Pharmacological DVT Prophylaxis Ordered: enoxaparin (Lovenox).  Code Status: Level 3 - DNAR and DNI  Discussion with family: Patient declined call to .     Anticipated Length of Stay: Patient will be admitted on an inpatient basis with an anticipated length of stay of greater than 2 midnights secondary to biopsy.    History of Present  Illness   Chief Complaint: head pain, neck swelling, dysphagia    Bryant Meza is a 69 y.o. male with a PMH of CABG 2014, CAD, carotid dx s/p L endarterectomy, HLD who presents with dysphagia and head pain intermittent, sharp starts on left frontal that spreads to right frontal.  History very limited as patient is currently frustrated/upset at possible findings on CTA, which noted multiple enlarged cervical lymph nodes concerning for hector metastases and contrast enhancement at base of tongue.    Patient states that he has been seen in consultation by several specialists in the past over the past couple years, with no diagnosis.    Patient has seen cardiology recently in June 2023 echo 2022 with inferior hypokinesis EF 55%, stress testing showed no ischemia.  Patient did not follow-up with cardiology since he stated that he was told that there is no treatment.      Last carotid study was March 2024 showing R carotid 20-49% stenosis and prior left carotid endarterectomy widely patent - no change from 2016.    He is a former smoker.    Denies lightheadedness/dizziness, shortness of breath, abdominal pain, change in BM.    Review of Systems   All other systems reviewed and are negative.      Historical Information   No past medical history on file.  No past surgical history on file.  Social History     Tobacco Use    Smoking status: Not on file    Smokeless tobacco: Not on file   Substance and Sexual Activity    Alcohol use: Not on file    Drug use: Not on file    Sexual activity: Not on file     No existing history information found.  No existing history information found.  Social History:  Marital Status: /Civil Union   Occupation: Noncontributory  Patient Pre-hospital Living Situation: Home  Patient Pre-hospital Level of Mobility: walks  Patient Pre-hospital Diet Restrictions: None    Meds/Allergies   I have reviewed home medications with patient personally.  Prior to Admission medications    Medication Sig  Start Date End Date Taking? Authorizing Provider   aspirin 81 MG tablet Take 1 tablet (81 mg total) by mouth daily 10/31/19   Maikel Bustamante MD   atorvastatin (LIPITOR) 20 mg tablet Take 1 tablet (20 mg total) by mouth daily  Patient taking differently: Take 10 mg by mouth daily Taking twice a week 10/31/19   Maikel Bustamante MD   co-enzyme Q-10 50 MG capsule Take 2 capsules (100 mg total) by mouth daily  Patient not taking: Reported on 2/18/2022 10/31/19   Maikel Bustamante MD   DULoxetine (CYMBALTA) 20 mg capsule Take 20 mg by mouth daily  Patient not taking: Reported on 6/14/2023    Historical Provider, MD   mometasone (ELOCON) 0.1 % cream Apply topically 3/1/18   Historical Provider, MD   nitroglycerin (NITROSTAT) 0.4 mg SL tablet Place 1 tablet (0.4 mg total) under the tongue every 5 (five) minutes as needed for chest pain 6/6/23 6/5/24  Maikel Bustamante MD     Allergies   Allergen Reactions    Levetiracetam Irritability and Myalgia    Simvastatin Myalgia       Objective :  Temp:  [97.9 °F (36.6 °C)] 97.9 °F (36.6 °C)  HR:  [76-85] 76  BP: (145-146)/(73-75) 146/75  Resp:  [18] 18  SpO2:  [97 %] 97 %  O2 Device: None (Room air)    Physical Exam  Vitals and nursing note reviewed.   Constitutional:       General: He is not in acute distress.     Appearance: He is well-developed.   HENT:      Head: Normocephalic and atraumatic.   Eyes:      Conjunctiva/sclera: Conjunctivae normal.   Cardiovascular:      Rate and Rhythm: Normal rate and regular rhythm.      Heart sounds: No murmur heard.  Pulmonary:      Effort: Pulmonary effort is normal. No respiratory distress.      Breath sounds: Normal breath sounds. No wheezing.   Abdominal:      General: Bowel sounds are normal.      Palpations: Abdomen is soft.      Tenderness: There is no abdominal tenderness.   Musculoskeletal:         General: Tenderness (Of left lateral neck) present. No swelling.      Cervical back: Neck supple.      Right lower leg: No edema.      Left lower leg:  No edema.   Skin:     General: Skin is warm and dry.      Capillary Refill: Capillary refill takes less than 2 seconds.   Neurological:      General: No focal deficit present.      Mental Status: He is alert and oriented to person, place, and time. Mental status is at baseline.   Psychiatric:         Mood and Affect: Mood normal.         Lines/Drains:            Lab Results: I have reviewed the following results:  Results from last 7 days   Lab Units 03/11/25  1428   WBC Thousand/uL 7.68   HEMOGLOBIN g/dL 15.1   HEMATOCRIT % 44.8   PLATELETS Thousands/uL 206   SEGS PCT % 64   LYMPHO PCT % 22   MONO PCT % 11   EOS PCT % 2     Results from last 7 days   Lab Units 03/11/25  1428   SODIUM mmol/L 137   POTASSIUM mmol/L 4.3   CHLORIDE mmol/L 103   CO2 mmol/L 26   BUN mg/dL 18   CREATININE mg/dL 0.93   ANION GAP mmol/L 8   CALCIUM mg/dL 9.3   ALBUMIN g/dL 4.5   TOTAL BILIRUBIN mg/dL 0.48   ALK PHOS U/L 101   ALT U/L 12   AST U/L 14   GLUCOSE RANDOM mg/dL 120             Lab Results   Component Value Date    HGBA1C 5.3 04/04/2014           Imaging Results Review: I reviewed radiology reports from this admission including: chest xray and CT head.  Other Study Results Review: EKG was reviewed.     Administrative Statements     ** Please Note: This note has been constructed using a voice recognition system. **

## 2025-03-11 NOTE — ASSESSMENT & PLAN NOTE
VSS, CMP and CBC WNL.  CTA notable for multiple enlarged cervical lymph nodes, largest lymph node in right measuring 1.7 cm and appears centrally necrotic, worrisome for hector metastases, and nonspecific contrast-enhancement at base of tongue, particularly on right.  On exam, patient has significant tenderness of left lateral neck.  Patient is frustrated with lack of diagnosis throughout the past years.  Possible malignancy of tongue versus other primary malignancy, such as lung due to smoking history.    Plan:  Consult ENT and IR, excisional biopsy versus biopsy of lymph node.  Patient is very adamant on biopsy on 3/12.  N.p.o. overnight.  Hold DVT prophylaxis.  Trial carbamazepine x 1.  Added Toradol 30 mg IV every 6 hours as needed for head pain.  Consider migraine cocktail if head pain not adequately controlled.

## 2025-03-11 NOTE — ASSESSMENT & PLAN NOTE
Currently not on any medications.  Patient is willing to restart aspirin at this time, but will currently hold for possible biopsy on 3/12.   no abrasions, no jaundice, no lesions, no pruritis, and no rashes.

## 2025-03-12 ENCOUNTER — APPOINTMENT (INPATIENT)
Dept: RADIOLOGY | Facility: HOSPITAL | Age: 70
DRG: 825 | End: 2025-03-12
Payer: MEDICARE

## 2025-03-12 VITALS
OXYGEN SATURATION: 96 % | RESPIRATION RATE: 18 BRPM | HEART RATE: 79 BPM | DIASTOLIC BLOOD PRESSURE: 84 MMHG | TEMPERATURE: 98.6 F | SYSTOLIC BLOOD PRESSURE: 137 MMHG

## 2025-03-12 LAB
ANION GAP SERPL CALCULATED.3IONS-SCNC: 9 MMOL/L (ref 4–13)
BASOPHILS # BLD MANUAL: 0.19 THOUSAND/UL (ref 0–0.1)
BASOPHILS NFR MAR MANUAL: 3 % (ref 0–1)
BUN SERPL-MCNC: 20 MG/DL (ref 5–25)
CALCIUM SERPL-MCNC: 8.8 MG/DL (ref 8.4–10.2)
CHLORIDE SERPL-SCNC: 105 MMOL/L (ref 96–108)
CO2 SERPL-SCNC: 25 MMOL/L (ref 21–32)
CREAT SERPL-MCNC: 0.96 MG/DL (ref 0.6–1.3)
EOSINOPHIL # BLD MANUAL: 0.38 THOUSAND/UL (ref 0–0.4)
EOSINOPHIL NFR BLD MANUAL: 6 % (ref 0–6)
ERYTHROCYTE [DISTWIDTH] IN BLOOD BY AUTOMATED COUNT: 13 % (ref 11.6–15.1)
GFR SERPL CREATININE-BSD FRML MDRD: 79 ML/MIN/1.73SQ M
GLUCOSE SERPL-MCNC: 87 MG/DL (ref 65–140)
HCT VFR BLD AUTO: 42 % (ref 36.5–49.3)
HGB BLD-MCNC: 14 G/DL (ref 12–17)
LYMPHOCYTES # BLD AUTO: 1.27 THOUSAND/UL (ref 0.6–4.47)
LYMPHOCYTES # BLD AUTO: 19 % (ref 14–44)
MCH RBC QN AUTO: 30.7 PG (ref 26.8–34.3)
MCHC RBC AUTO-ENTMCNC: 33.3 G/DL (ref 31.4–37.4)
MCV RBC AUTO: 92 FL (ref 82–98)
MONOCYTES # BLD AUTO: 0.76 THOUSAND/UL (ref 0–1.22)
MONOCYTES NFR BLD: 12 % (ref 4–12)
NEUTROPHILS # BLD MANUAL: 3.76 THOUSAND/UL (ref 1.85–7.62)
NEUTS BAND NFR BLD MANUAL: 1 % (ref 0–8)
NEUTS SEG NFR BLD AUTO: 58 % (ref 43–75)
PLATELET # BLD AUTO: 201 THOUSANDS/UL (ref 149–390)
PLATELET BLD QL SMEAR: ADEQUATE
PMV BLD AUTO: 9.5 FL (ref 8.9–12.7)
POTASSIUM SERPL-SCNC: 4.3 MMOL/L (ref 3.5–5.3)
RBC # BLD AUTO: 4.56 MILLION/UL (ref 3.88–5.62)
RBC MORPH BLD: NORMAL
SODIUM SERPL-SCNC: 139 MMOL/L (ref 135–147)
VARIANT LYMPHS # BLD AUTO: 1 %
WBC # BLD AUTO: 6.37 THOUSAND/UL (ref 4.31–10.16)

## 2025-03-12 PROCEDURE — 76942 ECHO GUIDE FOR BIOPSY: CPT

## 2025-03-12 PROCEDURE — 88344 IMHCHEM/IMCYTCHM EA MLT ANTB: CPT | Performed by: PATHOLOGY

## 2025-03-12 PROCEDURE — 99239 HOSP IP/OBS DSCHRG MGMT >30: CPT | Performed by: INTERNAL MEDICINE

## 2025-03-12 PROCEDURE — 99232 SBSQ HOSP IP/OBS MODERATE 35: CPT | Performed by: INTERNAL MEDICINE

## 2025-03-12 PROCEDURE — 88307 TISSUE EXAM BY PATHOLOGIST: CPT | Performed by: PATHOLOGY

## 2025-03-12 PROCEDURE — 80048 BASIC METABOLIC PNL TOTAL CA: CPT

## 2025-03-12 PROCEDURE — 99221 1ST HOSP IP/OBS SF/LOW 40: CPT | Performed by: OTOLARYNGOLOGY

## 2025-03-12 PROCEDURE — 88341 IMHCHEM/IMCYTCHM EA ADD ANTB: CPT | Performed by: PATHOLOGY

## 2025-03-12 PROCEDURE — 38505 NEEDLE BIOPSY LYMPH NODES: CPT | Performed by: INTERNAL MEDICINE

## 2025-03-12 PROCEDURE — NC001 PR NO CHARGE: Performed by: INTERNAL MEDICINE

## 2025-03-12 PROCEDURE — 07B13ZX EXCISION OF RIGHT NECK LYMPHATIC, PERCUTANEOUS APPROACH, DIAGNOSTIC: ICD-10-PCS | Performed by: INTERNAL MEDICINE

## 2025-03-12 PROCEDURE — 85027 COMPLETE CBC AUTOMATED: CPT

## 2025-03-12 PROCEDURE — 88342 IMHCHEM/IMCYTCHM 1ST ANTB: CPT | Performed by: PATHOLOGY

## 2025-03-12 PROCEDURE — 38505 NEEDLE BIOPSY LYMPH NODES: CPT

## 2025-03-12 PROCEDURE — 85007 BL SMEAR W/DIFF WBC COUNT: CPT

## 2025-03-12 PROCEDURE — 76942 ECHO GUIDE FOR BIOPSY: CPT | Performed by: INTERNAL MEDICINE

## 2025-03-12 RX ORDER — LIDOCAINE WITH 8.4% SOD BICARB 0.9%(10ML)
SYRINGE (ML) INJECTION AS NEEDED
Status: DISCONTINUED | OUTPATIENT
Start: 2025-03-12 | End: 2025-03-12 | Stop reason: HOSPADM

## 2025-03-12 RX ADMIN — KETOROLAC TROMETHAMINE 30 MG: 30 INJECTION, SOLUTION INTRAMUSCULAR; INTRAVENOUS at 10:42

## 2025-03-12 RX ADMIN — KETOROLAC TROMETHAMINE 30 MG: 30 INJECTION, SOLUTION INTRAMUSCULAR; INTRAVENOUS at 04:15

## 2025-03-12 RX ADMIN — Medication 5 ML: at 16:10

## 2025-03-12 NOTE — CONSULTS
Consultation - [facility]/ENT   Bryant Meza 69 y.o. male MRN: [id number]  Unit/Bed#: -01 Encounter: [id number]        Assessment:  68 yo M with bilateral necrotic lymphadenopathy and base of tongue asymmetry, most likely secondary to malignancy.    Plan:  - CT soft tissue neck w contrast  - CT chest w contrast  - follow up outpatient with Dr. Pearson for further treatment discussion    History of Present Illness   Physician Requesting Consult: Kai Scott DO  Reason for Consult / Principal Problem: neck mass    HPI: Bryant Meza is a 69 y.o. year old male who presents with PMH of CABG, CAD, carotid dx s/p L endarterectomy, HLD presents with progressive dysphagia to solids.  He has had progressive enlarging neck mass that is painful over the last few months. Mucus and phlegm in the throat.    No Fevers, no night sweats, no weight loss  Smoking history: 0.5 ppd > 20 yrs, quit in 2014, restarted over the last year      Review of systems:  10 Point ROS was performed and negative except as above or otherwise noted in the medical record.    Historical Information   History reviewed. No pertinent past medical history.  No past surgical history on file.  Social History   Social History     Substance and Sexual Activity   Alcohol Use None     Social History     Substance and Sexual Activity   Drug Use Not on file     Social History     Tobacco Use   Smoking Status Not on file   Smokeless Tobacco Not on file     Family History: Family history non-contributory    Meds/Allergies   all current active meds have been reviewed    Allergies   Allergen Reactions    Levetiracetam Irritability and Myalgia    Simvastatin Myalgia       Objective     Vitals:    03/11/25 2216   BP: 106/60   Pulse: 89   Resp: 18   Temp: 98.2 °F (36.8 °C)   SpO2:          Physical Exam   Constitutional: Oriented to person, place, and time. Well-developed and well-nourished, no apparent distress, non-toxic appearance. Cooperative, able to hear and answer  questions without difficulty.    Voice: Mucus voice quality.  Head: Normocephalic, atraumatic.  No scars, masses or lesions.  Face: Symmetric, no edema, no sinus tenderness.  Eyes: Vision grossly intact, extra-ocular movement intact.  Ears: External ears normal.  No post-auricular erythema or tenderness.  Nose: Septum intact, nares clear.  Mucosa moist, turbinates well appearing.  No crusting, polyps or discharge evident.  Oral cavity: Dentition intact.  Mucosa moist, lips without lesions or masses.  Tongue mobile, floor of mouth soft and flat.  Hard palate intact.  No masses or lesions. Tenderness in oral cavity  Oropharynx: Uvula is midline, soft palate intact without lesion or mass.  Oropharyngeal inlet without obstruction.  Tonsils unremarkable.  Posterior pharyngeal wall with thick mucus. No masses or lesions.  Salivary glands:  Parotid glands and submandibular glands symmetric, no enlargement or tenderness.  Neck: Bilateral firm, fixed lymphadenopathy in Right level 4, multiple LAD on the left  Thyroid: Without tenderness or palpable nodules.  Pulmonary/Chest: Normal effort and rate. No respiratory distress. No stertor or stridor  Musculoskeletal: Normal range of motion.   Neurological: Cranial nerves 2-12 intact.  Skin: Skin is warm and dry.   Psychiatric: Normal mood and affect.      No intake or output data in the 24 hours ending 03/12/25 0607    Invasive Devices       Peripheral Intravenous Line  Duration             Peripheral IV 03/11/25 Left Antecubital <1 day                    Lab Results: CBC:   Lab Results   Component Value Date    WBC 6.37 03/12/2025    HGB 14.0 03/12/2025    HCT 42.0 03/12/2025    MCV 92 03/12/2025     03/12/2025    RBC 4.56 03/12/2025    MCH 30.7 03/12/2025    MCHC 33.3 03/12/2025    RDW 13.0 03/12/2025    MPV 9.5 03/12/2025    NRBC 0 03/11/2025   , CMP:   Lab Results   Component Value Date    SODIUM 139 03/12/2025    K 4.3 03/12/2025     03/12/2025    CO2 25  "03/12/2025    BUN 20 03/12/2025    CREATININE 0.96 03/12/2025    CALCIUM 8.8 03/12/2025    AST 14 03/11/2025    ALT 12 03/11/2025    ALKPHOS 101 03/11/2025    EGFR 79 03/12/2025   , Coags: No results found for: \"PT\", \"PTT\", \"INR\"  Imaging Studies: Results Review Statement: I personally reviewed the following image studies/reports in PACS and discussed pertinent findings with Radiology: CT neck. My interpretation of the radiology images/reports is: nectoric b/l lymphadenopathy and possible base of tongue mass.  EKG, Pathology, and Other Studies: Results Review Statement: No pertinent imaging studies reviewed.    Code Status: Level 3 - DNAR and DNI  Advance Directive and Living Will:      Power of :    POLST:          "

## 2025-03-12 NOTE — ASSESSMENT & PLAN NOTE
VSS, CMP and CBC WNL.  CTA notable for multiple enlarged cervical lymph nodes, largest lymph node in right measuring 1.7 cm and appears centrally necrotic, worrisome for hector metastases, and nonspecific contrast-enhancement at base of tongue, particularly on right.  On exam, patient has significant tenderness of left lateral neck.  Possible malignancy of tongue versus other primary malignancy, such as lung due to smoking history.  ENT and IR consulted, recommending biopsy, scheduled for 3/12   Patient remains n.p.o. in hopes of biopsy performed today  DVT prophylaxis held  Given 1 dose carbamazepine and Toradol 30 mg IV every 6 hours, patient states improvement for several hours.    Plan:  Consider migraine cocktail if head pain not adequately controlled.  IR consulted, biopsy scheduled for 3/12

## 2025-03-12 NOTE — INCIDENTAL FINDINGS
The following findings require follow up:  Non-Radiographic finding   Finding: CTA head and neck with and without contrast: CT Brain: No acute intracranial abnormality., CT Angiography: No large vessel occlusion or hemodynamically significant stenosis.., Other:, 1. Multiple enlarged cervical lymph nodes in the bilateral level 2, 3, and left level 1B lymph node stations. The largest lymph node is in the right level 4 lymph node station measuring 1.7 cm and appears centrally necrotic. These are worrisome for , hector metastases. Recommend tissue sampling., 2. Nonspecific contrast-enhancement at the base of the tongue, particularly on the right. Recommend correlation with direct visualization to exclude possibility of an underlying mass.    Follow up required: yes   Follow up should be done within 1 week     Please notify the following clinician to assist with the follow up:   Dr. Dr. Huff    Incidental finding results were discussed with the Patient's POA by Salomón Xavier MD   They expressed understanding and all questions answered.

## 2025-03-12 NOTE — ASSESSMENT & PLAN NOTE
Currently not on any medications.  Patient is willing to restart aspirin at this time, but will currently hold for possible biopsy on 3/12.

## 2025-03-12 NOTE — ED PROVIDER NOTES
Time reflects when diagnosis was documented in both MDM as applicable and the Disposition within this note       Time User Action Codes Description Comment    3/11/2025  6:24 PM Linette Woods Add [R59.0] Cervical lymphadenopathy     3/11/2025  6:53 PM Linette Woods Add [K14.8] Tongue lesion     3/11/2025  6:54 PM Linette Woods Add [R51.9] Headache     3/11/2025  6:56 PM Linette Woods Modify [R51.9] Headache possible trigeminal neurolgia           ED Disposition       ED Disposition   Admit    Condition   Stable    Date/Time   Tue Mar 11, 2025  6:53 PM    Comment   Case was discussed with KAVON and the patient's admission status was agreed to be Admission Status: inpatient status to the service of Dr. Mendoza .               Assessment & Plan       Medical Decision Making  Differential diagnosis includes but not limited to: Soft tissue mass, lymphangitis, lymphadenopathy, vascular abnormality, trigeminal neuralgia, temporal arteritis, cluster headaches    Problems Addressed:  Cervical lymphadenopathy: acute illness or injury  Headache: acute illness or injury     Details: Will try Tegretol to see if any changes with patient's symptoms.  Tongue lesion: acute illness or injury    Amount and/or Complexity of Data Reviewed  Labs: ordered. Decision-making details documented in ED Course.  Radiology: ordered. Decision-making details documented in ED Course.     Details: Discussed CT findings with ENT.  Will be able to see in consultation if admitted to the hospital versus prompt outpatient follow-up.  Discussed concerns of patient very lost to follow-up with he is discharged to home.    Risk  Prescription drug management.  Decision regarding hospitalization.        ED Course as of 03/11/25 2117   Tue Mar 11, 2025   1826 Discussed CT imaging and need for follow-up with ENT.  Will make arrangements to be seen in the office as soon as possible.       Medications       ED Risk Strat Scores   HEART Risk Score       Flowsheet Row Most Recent Value   Heart Score Risk Calculator    History 0 Filed at: 03/11/2025 2117   ECG 0 Filed at: 03/11/2025 2117   Age 2 Filed at: 03/11/2025 2117   Risk Factors 1 Filed at: 03/11/2025 2117   Troponin 0 Filed at: 03/11/2025 2117   HEART Score 3 Filed at: 03/11/2025 2117          HEART Risk Score      Flowsheet Row Most Recent Value   Heart Score Risk Calculator    History 0 Filed at: 03/11/2025 2117   ECG 0 Filed at: 03/11/2025 2117   Age 2 Filed at: 03/11/2025 2117   Risk Factors 1 Filed at: 03/11/2025 2117   Troponin 0 Filed at: 03/11/2025 2117   HEART Score 3 Filed at: 03/11/2025 2117                                                  History of Present Illness       Chief Complaint   Patient presents with    Neck Swelling     Reports swelling and pain in creasing in left sided neck mass. Reports difficulty swallowing     Weakness - Generalized    Chest Pain     Patient reports having a history of pain in his chest that comes and goes but the pain is getting worse and more frequent        History reviewed. No pertinent past medical history.   No past surgical history on file.   History reviewed. No pertinent family history.       E-Cigarette/Vaping      E-Cigarette/Vaping Substances      I have reviewed and agree with the history as documented.     69-year-old male presents the emergency department with complaints of neck pain.  States that he has had ongoing issues with neck pain and swelling over the past several weeks to months.  States that symptoms seem to be worsening, specifically in the left side of the neck.  History of carotid endarterectomy with ultrasound done in the past several months which was normal.  Denies new injury to the area.  States that he has had some difficulty swallowing with increased swelling.  Denies difficulty talking.  No difficulty breathing.  Additionally with ongoing issues with left-sided headache.  States the pain seems to come and go in nature without  particular rhyme or reason.  Has been seen by several specialist regarding this without formal diagnosis.  Prescribed sudden onset of sharp pain lasting less than 1 minute prior to resolution.  States that he does experience some tearing from the left eye at times without changes in speech or vision.      History provided by:  Patient and relative   used: No    Chest Pain  Associated symptoms: dysphagia    Associated symptoms: no abdominal pain, no cough, no dizziness, no fever, no headache, no nausea, no numbness, no palpitations, no shortness of breath, not vomiting and no weakness        Review of Systems   Constitutional:  Negative for activity change, appetite change, chills and fever.   HENT:  Positive for congestion, trouble swallowing and voice change. Negative for dental problem, drooling, ear discharge, ear pain, mouth sores, nosebleeds, rhinorrhea and sore throat.    Eyes:  Negative for pain, discharge and itching.   Respiratory:  Negative for cough, chest tightness, shortness of breath and wheezing.    Cardiovascular:  Positive for chest pain. Negative for palpitations.   Gastrointestinal:  Negative for abdominal pain, blood in stool, constipation, diarrhea, nausea and vomiting.   Endocrine: Negative for cold intolerance and heat intolerance.   Genitourinary:  Negative for difficulty urinating, dysuria, flank pain, frequency and urgency.   Musculoskeletal:  Positive for neck pain.   Skin:  Negative for rash and wound.   Allergic/Immunologic: Negative for food allergies and immunocompromised state.   Neurological:  Negative for dizziness, seizures, syncope, weakness, numbness and headaches.   Psychiatric/Behavioral:  Negative for agitation, behavioral problems and confusion.            Objective       ED Triage Vitals   Temperature Pulse Blood Pressure Respirations SpO2 Patient Position - Orthostatic VS   03/11/25 1411 03/11/25 1411 03/11/25 1411 03/11/25 1411 03/11/25 1411 03/11/25  1411   97.9 °F (36.6 °C) 85 145/73 18 97 % Sitting      Temp Source Heart Rate Source BP Location FiO2 (%) Pain Score    03/11/25 1411 03/11/25 1411 03/11/25 1411 -- 03/11/25 1943    Oral Monitor Right arm  10 - Worst Possible Pain      Vitals      Date and Time Temp Pulse SpO2 Resp BP Pain Score FACES Pain Rating User   03/11/25 1943 -- -- -- -- -- 10 - Worst Possible Pain -- CC   03/11/25 1600 -- 76 97 % 18 146/75 -- -- ZC   03/11/25 1411 97.9 °F (36.6 °C) 85 97 % 18 145/73 -- -- SB            Physical Exam  Vitals and nursing note reviewed.   Constitutional:       General: He is not in acute distress.     Appearance: He is not diaphoretic.   HENT:      Head: Normocephalic and atraumatic.      Right Ear: Tympanic membrane, ear canal and external ear normal.      Left Ear: Tympanic membrane, ear canal and external ear normal.      Mouth/Throat:      Mouth: Mucous membranes are moist.      Tongue: No lesions. Tongue does not deviate from midline.      Pharynx: No pharyngeal swelling or oropharyngeal exudate.   Eyes:      Conjunctiva/sclera: Conjunctivae normal.   Neck:      Vascular: No JVD.      Trachea: No tracheal deviation.     Cardiovascular:      Rate and Rhythm: Normal rate and regular rhythm.      Heart sounds: Normal heart sounds. No murmur heard.     No friction rub. No gallop.   Pulmonary:      Effort: Pulmonary effort is normal. No respiratory distress.      Breath sounds: Normal breath sounds. No wheezing or rales.   Chest:      Chest wall: No tenderness.   Abdominal:      General: Bowel sounds are normal. There is no distension.      Palpations: Abdomen is soft.      Tenderness: There is no abdominal tenderness. There is no guarding.   Musculoskeletal:         General: No tenderness or deformity. Normal range of motion.   Lymphadenopathy:      Cervical: Cervical adenopathy present.   Skin:     General: Skin is warm and dry.      Findings: No erythema or rash.   Neurological:      General: No focal  deficit present.      Mental Status: He is alert and oriented to person, place, and time.   Psychiatric:         Mood and Affect: Mood normal.         Behavior: Behavior normal.         Results Reviewed       Procedure Component Value Units Date/Time    HS Troponin I 2hr [[id number]]  (Normal) Collected: 03/11/25 1746    Lab Status: Final result Specimen: Blood from Arm, Left Updated: 03/11/25 1817     hs TnI 2hr 3 ng/L      Delta 2hr hsTnI >1 ng/L     Sedimentation rate, automated [[id number]]  (Normal) Collected: 03/11/25 1428    Lab Status: Final result Specimen: Blood from Arm, Left Updated: 03/11/25 1755     Sed Rate 17 mm/hour     C-reactive protein [[id number]]  (Abnormal) Collected: 03/11/25 1428    Lab Status: Final result Specimen: Blood from Arm, Left Updated: 03/11/25 1709     CRP 17.6 mg/L     HS Troponin 0hr (reflex protocol) [[id number]]  (Normal) Collected: 03/11/25 1428    Lab Status: Final result Specimen: Blood from Arm, Left Updated: 03/11/25 1503     hs TnI 0hr <2 ng/L     Comprehensive metabolic panel [[id number]] Collected: 03/11/25 1428    Lab Status: Final result Specimen: Blood from Arm, Left Updated: 03/11/25 1457     Sodium 137 mmol/L      Potassium 4.3 mmol/L      Chloride 103 mmol/L      CO2 26 mmol/L      ANION GAP 8 mmol/L      BUN 18 mg/dL      Creatinine 0.93 mg/dL      Glucose 120 mg/dL      Calcium 9.3 mg/dL      AST 14 U/L      ALT 12 U/L      Alkaline Phosphatase 101 U/L      Total Protein 7.6 g/dL      Albumin 4.5 g/dL      Total Bilirubin 0.48 mg/dL      eGFR 82 ml/min/1.73sq m     Narrative:      National Kid[employer] guidelines for Chronic Kidney Disease (CKD):     Stage 1 with normal or high GFR (GFR > 90 mL/min/1.73 square meters)    Stage 2 Mild CKD (GFR = 60-89 mL/min/1.73 square meters)    Stage 3A Moderate CKD (GFR = 45-59 mL/min/1.73 square meters)    Stage 3B Moderate CKD (GFR = 30-44 mL/min/1.73 square meters)    Stage 4 Severe CKD (GFR = 15-29 mL/min/1.73  square meters)    Stage 5 End Stage CKD (GFR <15 mL/min/1.73 square meters)  Note: GFR calculation is accurate only with a steady state creatinine    CBC and differential [[id number]] Collected: 03/11/25 1428    Lab Status: Final result Specimen: Blood from Arm, Left Updated: 03/11/25 1440     WBC 7.68 Thousand/uL      RBC 4.90 Million/uL      Hemoglobin 15.1 g/dL      Hematocrit 44.8 %      MCV 91 fL      MCH 30.8 pg      MCHC 33.7 g/dL      RDW 13.0 %      MPV 9.7 fL      Platelets 206 Thousands/uL      nRBC 0 /100 WBCs      Segmented % 64 %      Immature Grans % 0 %      Lymphocytes % 22 %      Monocytes % 11 %      Eosinophils Relative 2 %      Basophils Relative 1 %      Absolute Neutrophils 4.91 Thousands/µL      Absolute Immature Grans 0.02 Thousand/uL      Absolute Lymphocytes 1.65 Thousands/µL      Absolute Monocytes 0.87 Thousand/µL      Eosinophils Absolute 0.17 Thousand/µL      Basophils Absolute 0.06 Thousands/µL             CTA head and neck with and without contrast   Final Interpretation by Raul Lloyd MD (03/11 1805)   CT Brain: No acute intracranial abnormality.      CT Angiography: No large vessel occlusion or hemodynamically significant stenosis..      Other:   1.  Multiple enlarged cervical lymph nodes in the bilateral level 2, 3, and left level 1B lymph node stations. The largest lymph node is in the right level 4 lymph node station measuring 1.7 cm and appears centrally necrotic. These are worrisome for    hector metastases. Recommend tissue sampling.   2.  Nonspecific contrast-enhancement at the base of the tongue, particularly on the right. Recommend correlation with direct visualization to exclude possibility of an underlying mass.            Workstation performed: [id number]         XR chest 2 views    (Results Pending)       ECG 12 Lead Documentation Only    Date/Time: 3/11/2025 9:16 PM    Performed by: Linette Woods PA-C  Authorized by: Linette Woods PA-C    Indications / Diagnosis:   Pain  ECG reviewed by me, the ED Provider: yes    Patient location:  ED  Previous ECG:     Previous ECG:  Compared to current    Comparison ECG info:  4/15/14    Comparison to cardiac monitor: No    Interpretation:     Interpretation: non-specific    Rate:     ECG rate:  86    ECG rate assessment: normal    Rhythm:     Rhythm: sinus rhythm    Ectopy:     Ectopy: none    QRS:     QRS axis:  Normal    QRS intervals:  Normal  Conduction:     Conduction: normal    ST segments:     ST segments:  Normal  T waves:     T waves: non-specific        ED Medication and Procedure Management   Prior to Admission Medications   Prescriptions Last Dose Informant Patient Reported? Taking?   DULoxetine (CYMBALTA) 20 mg capsule Not Taking Child, Self Yes No   Sig: Take 20 mg by mouth daily   Patient not taking: Reported on 6/14/2023   aspirin 81 MG tablet Not Taking Child, Self No No   Sig: Take 1 tablet (81 mg total) by mouth daily   Patient not taking: Reported on 3/11/2025   atorvastatin (LIPITOR) 20 mg tablet Not Taking Child, Self No No   Sig: Take 1 tablet (20 mg total) by mouth daily   Patient not taking: Reported on 3/11/2025   co-enzyme Q-10 50 MG capsule Not Taking Child, Self No No   Sig: Take 2 capsules (100 mg total) by mouth daily   Patient not taking: Reported on 2/18/2022   mometasone (ELOCON) 0.1 % cream Not Taking Child, Self Yes No   Sig: Apply topically   Patient not taking: Reported on 3/11/2025   nitroglycerin (NITROSTAT) 0.4 mg SL tablet  Child, Self No No   Sig: Place 1 tablet (0.4 mg total) under the tongue every 5 (five) minutes as needed for chest pain      Facility-Administered Medications: None     Patient's Medications   Discharge Prescriptions    No medications on file       ED SEPSIS DOCUMENTATION   Time reflects when diagnosis was documented in both MDM as applicable and the Disposition within this note       Time User Action Codes Description Comment    3/11/2025  6:24 PM Linette Woods Add [R59.0]  Cervical lymphadenopathy     3/11/2025  6:53 PM Linette Woods Add [K14.8] Tongue lesion     3/11/2025  6:54 PM Linette Woods Add [R51.9] Headache     3/11/2025  6:56 PM Linette Woods Modify [R51.9] Headache possible trigeminal neurolgia                  Linette Woods PA-C  03/11/25 2111       Linette Woods PA-C  03/11/25 2117

## 2025-03-12 NOTE — SEDATION DOCUMENTATION
Right cervical lymph node biopsy completed by Dr. Hughes. Band-aid to site. Specimen sent to lab. Patient tolerated well.

## 2025-03-12 NOTE — PROGRESS NOTES
Progress Note - Hospitalist   Name: Bryant Meza 69 y.o. male I MRN: [id number]  Unit/Bed#: -01 I Date of Admission: 3/11/2025   Date of Service: 3/12/2025 I Hospital Day: 1    Assessment & Plan  Neck pain  VSS, CMP and CBC WNL.  CTA notable for multiple enlarged cervical lymph nodes, largest lymph node in right measuring 1.7 cm and appears centrally necrotic, worrisome for hector metastases, and nonspecific contrast-enhancement at base of tongue, particularly on right.  On exam, patient has significant tenderness of left lateral neck.  Possible malignancy of tongue versus other primary malignancy, such as lung due to smoking history.  ENT and IR consulted, recommending biopsy, scheduled for 3/12   Patient remains n.p.o. in hopes of biopsy performed today  DVT prophylaxis held  Given 1 dose carbamazepine and Toradol 30 mg IV every 6 hours, patient states improvement for several hours.    Plan:  Consider migraine cocktail if head pain not adequately controlled.  IR consulted, biopsy scheduled for 3/12  Headache  See under neck pain.  Hx of CABG  Currently not on any medications.  Patient is willing to restart aspirin at this time, but will currently hold for possible biopsy on 3/12.  Hyperlipidemia  Currently not on any medications.  Patient unwilling to restart atorvastatin at this time.  Coronary artery disease of native artery of native heart with stable angina pectoris (HCC)  2/22 echo: LVEF 55%, normal systolic function, G1 DD.   Cervical lymphadenopathy  See under neck pain.      Subjective   Patient seen and examined. No acute events overnight.  Patient with continued neck pain at site of mass and intermittent headaches which have been chronic and pre-existing.  No changes in headaches.  Patient displays frustration regarding scheduling of biopsy, and states he wants to leave if biopsy not performed today.  All questions and concerns were answered at bedside, and ENT made aware.    Objective :  Temp:   [97.9 °F (36.6 °C)-98.2 °F (36.8 °C)] 98.2 °F (36.8 °C)  HR:  [73-89] 73  BP: (105-146)/(59-75) 131/60  Resp:  [16-18] 18  SpO2:  [96 %-97 %] 97 %  O2 Device: None (Room air)    I/O       None          Weights:        There is no height or weight on file to calculate BMI.  Weight (last 2 days)       None            Physical Exam  Vitals and nursing note reviewed.   Constitutional:       General: He is not in acute distress.     Appearance: He is well-developed.   HENT:      Head: Normocephalic and atraumatic.   Eyes:      Conjunctiva/sclera: Conjunctivae normal.   Cardiovascular:      Rate and Rhythm: Normal rate and regular rhythm.      Heart sounds: No murmur heard.  Pulmonary:      Effort: Pulmonary effort is normal. No respiratory distress.      Breath sounds: Normal breath sounds.   Abdominal:      Palpations: Abdomen is soft.      Tenderness: There is no abdominal tenderness.   Musculoskeletal:         General: No swelling.      Cervical back: Neck supple. Tenderness (mass on left sternocleidomastoid) present.   Skin:     General: Skin is warm and dry.      Capillary Refill: Capillary refill takes less than 2 seconds.   Neurological:      Mental Status: He is alert.   Psychiatric:         Mood and Affect: Mood normal.           Lab Results: I have reviewed the following results:  Recent Labs     03/11/25  1428 03/11/25  1746 03/12/25  0512   WBC 7.68  --  6.37   HGB 15.1  --  14.0   HCT 44.8  --  42.0     --  201   BANDSPCT  --   --  1   SODIUM 137  --  139   K 4.3  --  4.3     --  105   CO2 26  --  25   BUN 18  --  20   CREATININE 0.93  --  0.96   GLUC 120  --  87   AST 14  --   --    ALT 12  --   --    ALB 4.5  --   --    TBILI 0.48  --   --    ALKPHOS 101  --   --    HSTNI0 <2  --   --    HSTNI2  --  3  --        Imaging Results Review: I reviewed radiology reports from this admission including: chest xray and CT head.  Other Study Results Review: EKG was reviewed.     Currently Ordered Meds:    Current Facility-Administered Medications:     acetaminophen (TYLENOL) tablet 650 mg, Q6H PRN    [Held by provider] aspirin (ECOTRIN LOW STRENGTH) EC tablet 81 mg, Daily    [Held by provider] enoxaparin (LOVENOX) subcutaneous injection 40 mg, Daily    ketorolac (TORADOL) injection 30 mg, Q6H PRN    nitroglycerin (NITROSTAT) SL tablet 0.4 mg, Q5 Min PRN    VTE Pharmacologic Prophylaxis: VTE Score: 4 Moderate Risk (Score 3-4) - Pharmacological DVT Prophylaxis Ordered: enoxaparin (Lovenox).     Administrative Statements   I have spent a total time of 45 minutes in caring for this patient on the day of the visit/encounter including Diagnostic results, Prognosis, Risks and benefits of tx options, Instructions for management, Patient and family education, Importance of tx compliance, Risk factor reductions, Impressions, Counseling / Coordination of care, Documenting in the medical record, Reviewing/placing orders in the medical record (including tests, medications, and/or procedures), Obtaining or reviewing history  , and Communicating with other healthcare professionals .  Portions of the record may have been created with voice recognition software.

## 2025-03-12 NOTE — QUICK NOTE
Received messages from nursing staff regarding patient's wishes to leave this evening.  Per patient and patient's daughter, patient was reportedly told that he could be discharged tonight 3/12/2025.  After speaking with patient's day team resident physicians and attending physician, it seems as though there may have been some confusion regarding discharge as day team noted that patient was planning to be discharged tomorrow as he just had a right cervical lymph node biopsy completed this evening.  Concern was noted regarding possible cervical injury and/or symptoms such as dizziness, lightheadedness, neck pain, or increased bleeding.  Stressed the importance to patient of how sensitive injuries to the neck can be given the delicate arterial supply in the head and neck.  Both patient and patient's daughter voiced understanding regarding risks if patient chooses to be discharged tonight. Spoke with patient's day team including Dr. Muñiz, Dr. Xavier, and Dr. Scott who said patient would be able to be discharged tonight after monitoring for several hours and explaining risks of leaving tonight as opposed to being discharged tomorrow 3/13/2025.  Emphasized to patient and patient's daughter strict return precautions to go to the ED if patient experiences increased pain, bleeding, lightheadedness, vision changes, or dizziness.  Patient and patient's daughter both voiced understanding and asked to move forward with discharge tonight.  Day team signed discharge orders and on-call team examined patient in person prior to discharge and once again emphasized risks of leaving tonight and return precautions.    Patient noted that he was told that he would be given pain medication upon discharge.  Explained to patient that if he would like extensive pain management medication, he would have to wait until tomorrow morning for pain medication prescription from the day team.  Patient stated that he would like to be discharged tonight  and will follow-up with his PCP and ENT regarding obtaining pain medication tomorrow.  Both daughter and daughter's  are in the medical field and have good medical literacy and stated they would assist patient with obtaining pain medication from PCP or ENT after discharge.  All questions were answered to the best of my ability.

## 2025-03-12 NOTE — DISCHARGE INSTR - AVS FIRST PAGE
Dear Bryant Meza,     It was our pleasure to care for you here at [facility].  It is our hope that we were always able to exceed the expected standards for your care during your stay.  You were hospitalized due to worsening neck swelling and dysphagia.  You were cared for on the medical floor by Dr. Xavier under the service of Kai Scott DO with the [facility] who covers for your primary care physician (PCP), Regina Guerrier MD, while you were hospitalized.  If you have any questions or concerns related to this hospitalization, you may contact us at .  For follow up as well as any medication refills, we recommend that you follow up with your primary care physician.  A registered nurse will reach out to you by phone within a few days after your discharge to answer any additional questions that you may have after going home.  However, at this time we provide for you here, the most important instructions / recommendations at discharge:     Notable Medication Adjustments -   none  Testing Required after Discharge -   Chest CT with contrast, please ask ENT to place order  Flexible nasolaryngoscopy to assess oropharynx for neoplasm in the ENT office  ** Please contact your PCP to request testing orders for any of the testing recommended here **  Important follow up information -   Follow up IR biopsy that was performed outpatient  Other Instructions -   Please follow up with IR and PCP  Please review this entire after visit summary as additional general instructions including medication list, appointments, activity, diet, any pertinent wound care, and other additional recommendations from your care team that may be provided for you.      Sincerely,     Peg Muñiz DO

## 2025-03-12 NOTE — BRIEF OP NOTE (RAD/CATH)
INTERVENTIONAL RADIOLOGY PROCEDURE NOTE    Date: 3/12/2025    Procedure:   Procedure Summary       Date:  Room / Location:     Anesthesia Start:  Anesthesia Stop:     Procedure:  Diagnosis:     Scheduled Providers:  Responsible Provider:     Anesthesia Type: Not recorded ASA Status: Not recorded            Preoperative diagnosis:   1. Cervical lymphadenopathy    2. Tongue lesion    3. Headache         Postoperative diagnosis: Same.    Surgeon: Kurt Hughes MD     Assistant: None. No qualified resident was available.    Blood loss: 1 mL    Specimens: 7 core biopsy specimen submitted in RPMI and formalin     Findings: Ultrasound-guide core biopsy of an abnormally enlarged right neck lymph node with 7 passes performed.    Complications: None immediate.    Anesthesia: local   Aspiration pneumonia    BPH (benign prostatic hyperplasia)    CAD (Coronary Artery Disease)    Clostridium difficile colitis    HTN (Hypertension)    Hyperlipidemia    MI, old    Parkinsons Disease    Pneumonia    Unilateral inguinal hernia, with gangrene, not specified as recurrent    Upper GI bleed  MICU admission 2015, EGD w/ gastric ulcer/visible vessel which was cauterized  UTI (urinary tract infection)

## 2025-03-12 NOTE — TELEMEDICINE
e-Consult (IPC)  - Interventional Radiology  Bryant Meza 69 y.o. male MRN: [id number]  Unit/Bed#: -01 Encounter: [id number]          Interventional Radiology has been consulted to evaluate Bryant Meza    We were consulted by ENT concerning this patient with lymphadenopathy.    Inpatient Consult to IR  Consult performed by: Kurt Hughes MD  Consult ordered by: Bright Rosas MD        03/12/25    Assessment/Recommendation:   70 yo M with bilateral necrotic lymphadenopathy and base of tongue asymmetry.    CTA of the neck showed bilateral cervical lymphadenopathy.    We will plan for biopsy of the largest right level 4 lymph node.    >5 min, >50% time spent reviewing/analyzing record, written report will be generated in the EMR.     Thank you for allowing Interventional Radiology to participate in the care of Bryant Meza. Please don't hesitate to call or TigerText us with any questions.     Kurt Hughes MD

## 2025-03-13 NOTE — PLAN OF CARE
Problem: PAIN - ADULT  Goal: Verbalizes/displays adequate comfort level or baseline comfort level  Description: Interventions:  - Encourage patient to monitor pain and request assistance  - Assess pain using appropriate pain scale  - Administer analgesics based on type and severity of pain and evaluate response  - Implement non-pharmacological measures as appropriate and evaluate response  - Consider cultural and social influences on pain and pain management  - Notify physician/advanced practitioner if interventions unsuccessful or patient reports new pain  Outcome: Completed     Problem: INFECTION - ADULT  Goal: Absence or prevention of progression during hospitalization  Description: INTERVENTIONS:  - Assess and monitor for signs and symptoms of infection  - Monitor lab/diagnostic results  - Monitor all insertion sites, i.e. indwelling lines, tubes, and drains  - Monitor endotracheal if appropriate and nasal secretions for changes in amount and color  - [place] appropriate cooling/warming therapies per order  - Administer medications as ordered  - Instruct and encourage patient and family to use good hand hygiene technique  - Identify and instruct in appropriate isolation precautions for identified infection/condition  Outcome: Completed  Goal: Absence of fever/infection during neutropenic period  Description: INTERVENTIONS:  - Monitor WBC    Outcome: Completed     Problem: SAFETY ADULT  Goal: Patient will remain free of falls  Description: INTERVENTIONS:  - Educate patient/family on patient safety including physical limitations  - Instruct patient to call for assistance with activity   - Consult OT/PT to assist with strengthening/mobility   - Keep Call bell within reach  - Keep bed low and locked with side rails adjusted as appropriate  - Keep care items and personal belongings within reach  - Initiate and maintain comfort rounds  - Make Fall Risk Sign visible to staff  - Offer Toileting every  Hours, in  advance of need  - Initiate/Maintain alarm  - Obtain necessary fall risk management equipment:   - Apply yellow socks and bracelet for high fall risk patients  - Consider moving patient to room near nurses station  Outcome: Completed  Goal: Maintain or return to baseline ADL function  Description: INTERVENTIONS:  -  Assess patient's ability to carry out ADLs; assess patient's baseline for ADL function and identify physical deficits which impact ability to perform ADLs (bathing, care of mouth/teeth, toileting, grooming, dressing, etc.)  - Assess/evaluate cause of self-care deficits   - Assess range of motion  - Assess patient's mobility; develop plan if impaired  - Assess patient's need for assistive devices and provide as appropriate  - Encourage maximum independence but intervene and supervise when necessary  - Involve family in performance of ADLs  - Assess for home care needs following discharge   - Consider OT consult to assist with ADL evaluation and planning for discharge  - Provide patient education as appropriate  Outcome: Completed  Goal: Maintains/Returns to pre admission functional level  Description: INTERVENTIONS:  - Perform AM-PAC 6 Click Basic Mobility/ Daily Activity assessment daily.  - Set and communicate daily mobility goal to care team and patient/family/caregiver.   - Collaborate with rehabilitation services on mobility goals if consulted  - Perform Range of Motion  times a day.  - Reposition patient every  hours.  - Dangle patient  times a day  - Stand patient  times a day  - Ambulate patient  times a day  - Out of bed to chair  times a day   - Out of bed for meals  times a day  - Out of bed for toileting  - Record patient progress and toleration of activity level   Outcome: Completed     Problem: DISCHARGE PLANNING  Goal: Discharge to home or other facility with appropriate resources  Description: INTERVENTIONS:  - Identify barriers to discharge w/patient and caregiver  - Arrange for needed  discharge resources and transportation as appropriate  - Identify discharge learning needs (meds, wound care, etc.)  - Arrange for interpretive services to assist at discharge as needed  - Refer to Case Management Department for coordinating discharge planning if the patient needs post-hospital services based on physician/advanced practitioner order or complex needs related to functional status, cognitive ability, or social support system  Outcome: Completed     Problem: Knowledge Deficit  Goal: Patient/family/caregiver demonstrates understanding of disease process, treatment plan, medications, and discharge instructions  Description: Complete learning assessment and assess knowledge base.  Interventions:  - Provide teaching at level of understanding  - Provide teaching via preferred learning methods  Outcome: Completed

## 2025-03-13 NOTE — DISCHARGE SUMMARY
Discharge Summary - Hospitalist   Name: Bryant Meza 69 y.o. male I MRN: [id number]  Unit/Bed#: S -01 I Date of Admission: 3/11/2025   Date of Service: 3/12/25 I Hospital Day: 1     Assessment & Plan  Neck pain  VSS, CMP and CBC WNL.  CTA notable for multiple enlarged cervical lymph nodes, largest lymph node in right measuring 1.7 cm and appears centrally necrotic, worrisome for hector metastases, and nonspecific contrast-enhancement at base of tongue, particularly on right.  On exam, patient has significant tenderness of left lateral neck.  Possible malignancy of tongue versus other primary malignancy, such as lung due to smoking history.  ENT and IR consulted, recommending biopsy, scheduled for 3/12   Patient remains n.p.o. in hopes of biopsy performed today  DVT prophylaxis held  Given 1 dose carbamazepine and Toradol 30 mg IV every 6 hours, patient states improvement for several hours.    Plan:  Consider migraine cocktail if head pain not adequately controlled.  IR consulted, biopsy scheduled for 3/12  Headache  See under neck pain.  Hx of CABG  Currently not on any medications.  Patient is willing to restart aspirin at this time, but will currently hold for possible biopsy on 3/12.  Hyperlipidemia  Currently not on any medications.  Patient unwilling to restart atorvastatin at this time.  Coronary artery disease of native artery of native heart with stable angina pectoris (HCC)  2/22 echo: LVEF 55%, normal systolic function, G1 DD.   Cervical lymphadenopathy  See under neck pain.     Medical Problems      Discharging Physician / Practitioner: Salomón Xavier MD  PCP: Regina Guerrier MD  Admission Date:   Admission Orders (From admission, onward)       Ordered        03/11/25 1857  INPATIENT ADMISSION  Once                          Discharge Date: 03/13/25    Consultations During Hospital Stay:  ENT, IR    Procedures Performed:   IR lymph node biopsy    Significant Findings / Test Results:   Biopsy  pathology    Incidental Findings:   none       Test Results Pending at Discharge (will require follow up):   Lymph node biopsy     Outpatient Tests Requested:  Follow up with ENT    Complications:  none    Reason for Admission: neck pain and headache    Hospital Course:   Bryant Meza is a 69 y.o. male with a PMH of CABG 2014, CAD, carotid dx s/p L endarterectomy, HLD who presents with dysphagia and head pain intermittent, sharp starts on left frontal that spreads to right frontal.  History very limited as patient is currently frustrated/upset at possible findings on CTA, which noted multiple enlarged cervical lymph nodes concerning for hector metastases and contrast enhancement at base of tongue.  His pain was controlled with toradol and ENT and IR consults were placed.  ENT recommended outpatient follow up with biopsy done.  IR team decided they can pursue with lymph node biopsy on this admission.  We discussed extensively with patient and daughter regarding plan.  They initially wanted to be discharged and follow up outpatient with ENT, however they further then decided to stay and have the biopsy done with the IR team.  Biopsy was done during this admission.  Following procedure, patient and daughter were adamant that they wanted to leave.  Our team was notified that the patient and daughter both wanted to leave, however our team discussed due to his biopsy performed on his neck, we explained that is medically advised to stay overnight to monitor just to be cautious.  Nursing staff notified patient and daughter and they were still adamant on leaving and told nursing that the IR team said they can leave after the procedure.  Patient was vitally stable and they were discharged per nursing staff and on call resident.          Condition at Discharge: stable    Discharge Day Visit / Exam:   Subjective:  evaluated patient at bedside, resting comfortably.  In no apparent distress.   Pain controlled, vitally stable, all  questions and concerns answered.  Vitals: Blood Pressure: 137/84 (03/12/25 1940)  Pulse: 79 (03/12/25 1940)  Temperature: 98.6 °F (37 °C) (03/12/25 1940)  Temp Source: Oral (03/12/25 0700)  Respirations: 18 (03/12/25 0700)  SpO2: 96 % (03/12/25 1940)  Physical Exam  Vitals and nursing note reviewed.   Constitutional:       General: He is not in acute distress.     Appearance: He is well-developed.   HENT:      Head: Normocephalic and atraumatic.   Eyes:      Conjunctiva/sclera: Conjunctivae normal.   Cardiovascular:      Rate and Rhythm: Normal rate and regular rhythm.      Heart sounds: No murmur heard.  Pulmonary:      Effort: Pulmonary effort is normal. No respiratory distress.      Breath sounds: Normal breath sounds.   Abdominal:      Palpations: Abdomen is soft.      Tenderness: There is no abdominal tenderness.   Musculoskeletal:         General: No swelling.      Cervical back: Neck supple. Tenderness (left side) present.   Skin:     General: Skin is warm and dry.      Capillary Refill: Capillary refill takes less than 2 seconds.   Neurological:      Mental Status: He is alert.   Psychiatric:         Mood and Affect: Mood normal.          Discussion with Family: Updated  (daughter) at bedside.    Discharge instructions/Information to patient and family:   See after visit summary for information provided to patient and family.      Provisions for Follow-Up Care:  See after visit summary for information related to follow-up care and any pertinent home health orders.      Mobility at time of Discharge:   Basic Mobility Inpatient Raw Score: 23  JH-HLM Goal: 7: Walk 25 feet or more  JH-HLM Achieved: 7: Walk 25 feet or more  HLM Goal achieved. Continue to encourage appropriate mobility.     Disposition:   Home    Planned Readmission: no    Discharge Medications:  See after visit summary for reconciled discharge medications provided to patient and/or family.      Administrative Statements   Discharge  Statement:  I have spent a total time of 60 minutes in caring for this patient on the day of the visit/encounter. >30 minutes of time was spent on: Diagnostic results, Prognosis, Risks and benefits of tx options, Instructions for management, Patient and family education, Importance of tx compliance, Risk factor reductions, Impressions, Counseling / Coordination of care, Documenting in the medical record, Reviewing / ordering tests, medicine, procedures  , and Communicating with other healthcare professionals .    **Please Note: This note may have been constructed using a voice recognition system**

## 2025-03-14 ENCOUNTER — TRANSITIONAL CARE MANAGEMENT (OUTPATIENT)
Dept: INTERNAL MEDICINE CLINIC | Facility: CLINIC | Age: 70
End: 2025-03-14

## 2025-03-17 PROCEDURE — 88307 TISSUE EXAM BY PATHOLOGIST: CPT | Performed by: PATHOLOGY

## 2025-03-17 PROCEDURE — 88342 IMHCHEM/IMCYTCHM 1ST ANTB: CPT | Performed by: PATHOLOGY

## 2025-03-17 PROCEDURE — 88341 IMHCHEM/IMCYTCHM EA ADD ANTB: CPT | Performed by: PATHOLOGY

## 2025-03-17 PROCEDURE — 88344 IMHCHEM/IMCYTCHM EA MLT ANTB: CPT | Performed by: PATHOLOGY

## 2025-03-18 LAB — SCAN RESULT: NORMAL

## 2025-03-19 ENCOUNTER — HOSPITAL ENCOUNTER (OUTPATIENT)
Dept: RADIOLOGY | Age: 70
Discharge: HOME | End: 2025-03-19
Payer: MEDICARE

## 2025-03-19 DIAGNOSIS — C77.9 METASTATIC SQUAMOUS CELL CARCINOMA INVOLVING LYMPH NODE WITH UNKNOWN PRIMARY SITE (HCC): ICD-10-CM

## 2025-03-19 DIAGNOSIS — C01 MALIGNANT NEOPLASM OF BASE OF TONGUE (HCC): ICD-10-CM

## 2025-03-19 DIAGNOSIS — C80.1 METASTATIC SQUAMOUS CELL CARCINOMA INVOLVING LYMPH NODE WITH UNKNOWN PRIMARY SITE (HCC): ICD-10-CM

## 2025-03-19 LAB — GLUCOSE SERPL-MCNC: 97 MG/DL (ref 65–140)

## 2025-03-19 PROCEDURE — 78815 PET IMAGE W/CT SKULL-THIGH: CPT

## 2025-03-19 PROCEDURE — 82948 REAGENT STRIP/BLOOD GLUCOSE: CPT

## 2025-03-19 PROCEDURE — A9552 F18 FDG: HCPCS

## 2025-03-24 ENCOUNTER — DOCUMENTATION (OUTPATIENT)
Dept: HEMATOLOGY ONCOLOGY | Facility: CLINIC | Age: 70
End: 2025-03-24

## 2025-03-24 NOTE — PROGRESS NOTES
In-basket message received from Dr. Pearson to add patient to the head and neck [facility] on 3/31/2025. Chart reviewed and prep completed.

## 2025-03-26 ENCOUNTER — PATIENT OUTREACH (OUTPATIENT)
Dept: HEMATOLOGY ONCOLOGY | Facility: CLINIC | Age: 70
End: 2025-03-26

## 2025-03-26 NOTE — PROGRESS NOTES
Initial outreach. Spoke to Bryant. Introduced myself and explained the role of an Oncology Nurse Navigator to assist with coordination of cancer care, preparation for upcoming appointment, be a point of contact prior to oncology consult, provide support and connect him with available resources. Discussed Medical and Radiation Oncology referrals placed by Dr. Pearson.  Bryant is not ready to schedule with Oncology at this time.  He took my number and said he would call me back after he has a discussion with his daughter.  He is scheduled for follow-up with Dr. Pearson on 4/4/25.

## 2025-03-31 ENCOUNTER — DOCUMENTATION (OUTPATIENT)
Dept: HEMATOLOGY ONCOLOGY | Facility: CLINIC | Age: 70
End: 2025-03-31

## 2025-03-31 NOTE — PROGRESS NOTES
HEAD AND NECK MULTIDISCIPLINARY CASE REVIEW    DATE:    3/31/2025    PRESENTING DOCTOR: Dr. Dior    DIAGNOSIS: p16+, squamous cell carcinoma BOT   STAGING: T3N2M0     Bryant Meza is a 69 y.o. male who was presented at the Head and Neck Oncology Multidisciplinary Tumor Conference today. He was admitted to the hospital for progressive dysphagia to solids with enlarging neck mass. CTA noted multiple enlarged cervical lymph nodes concerning for hector metastases and contrast enhancement at base of tongue. IR right level 4 cervical lymph node biopsy was positive for squamous cell carcinoma (p16 positive) with necrosis. Patient does not with to pursue treatment at this time.       PHYSICIAN RECOMMENDED PLAN:  -Chemo/RT.    Imaging reviewed:   3/11/25 CT H/N  3/19/25 PET/CT    Pathology reviewed: No     Referrals: Placed to Medical and Radiation Oncology    Procedures: N/A     Team agreed to plan.  NCCN guidelines were readily available for review at this discussion    The final treatment plan will be left to the discretion of the patient and the treating physician.     DISCLAIMERS:  TO THE TREATING PHYSICIAN:  This conference is a meeting of clinicians from various specialty areas who evaluate and discuss patients for whom a multidisciplinary treatment approach is being considered. Please note that the above opinion was a consensus of the conference attendees and is intended only to assist in quality care of the discussed patient.  The responsibility for follow up on the input given during the conference, along with any final decisions regarding plan of care, is that of the patient and the patient's provider. Accordingly, appointments have only been recommended based on this information and have NOT been scheduled unless otherwise noted.      TO THE PATIENT:  This summary is a brief record of major aspects of your cancer treatment. You may choose to share a copy with any of your doctors or nurses. However, this is not a  detailed or comprehensive record of your care.

## 2025-04-23 ENCOUNTER — HOME CARE VISIT (OUTPATIENT)
Dept: HOME HEALTH SERVICES | Facility: HOME HEALTHCARE | Age: 70
End: 2025-04-23
Payer: MEDICARE

## 2025-04-23 ENCOUNTER — HOSPICE ADMISSION (OUTPATIENT)
Dept: HOME HOSPICE | Facility: HOSPICE | Age: 70
End: 2025-04-23
Payer: MEDICARE

## 2025-04-24 ENCOUNTER — HOME CARE VISIT (OUTPATIENT)
Dept: HOME HEALTH SERVICES | Facility: HOME HEALTHCARE | Age: 70
End: 2025-04-24
Attending: FAMILY MEDICINE
Payer: MEDICARE

## 2025-04-24 VITALS
RESPIRATION RATE: 18 BRPM | SYSTOLIC BLOOD PRESSURE: 118 MMHG | HEART RATE: 71 BPM | TEMPERATURE: 97.7 F | OXYGEN SATURATION: 99 % | DIASTOLIC BLOOD PRESSURE: 70 MMHG

## 2025-04-24 PROCEDURE — G0299 HHS/HOSPICE OF RN EA 15 MIN: HCPCS

## 2025-04-24 PROCEDURE — 10330057 MEDICATION, GENERAL

## 2025-04-25 ENCOUNTER — HOME CARE VISIT (OUTPATIENT)
Dept: HOME HOSPICE | Facility: HOSPICE | Age: 70
End: 2025-04-25
Payer: MEDICARE

## 2025-04-28 ENCOUNTER — HOME CARE VISIT (OUTPATIENT)
Dept: HOME HOSPICE | Facility: HOSPICE | Age: 70
End: 2025-04-28
Payer: MEDICARE

## 2025-04-30 ENCOUNTER — HOME CARE VISIT (OUTPATIENT)
Dept: HOME HEALTH SERVICES | Facility: HOME HEALTHCARE | Age: 70
End: 2025-04-30
Payer: MEDICARE

## 2025-04-30 VITALS — SYSTOLIC BLOOD PRESSURE: 107 MMHG | TEMPERATURE: 97.5 F | DIASTOLIC BLOOD PRESSURE: 65 MMHG | HEART RATE: 78 BPM

## 2025-04-30 PROCEDURE — G0299 HHS/HOSPICE OF RN EA 15 MIN: HCPCS

## 2025-05-05 ENCOUNTER — HOME CARE VISIT (OUTPATIENT)
Dept: HOME HEALTH SERVICES | Facility: HOME HEALTHCARE | Age: 70
End: 2025-05-05
Payer: MEDICARE

## 2025-05-05 PROCEDURE — G0299 HHS/HOSPICE OF RN EA 15 MIN: HCPCS

## 2025-05-06 ENCOUNTER — HOME CARE VISIT (OUTPATIENT)
Dept: HOME HOSPICE | Facility: HOSPICE | Age: 70
End: 2025-05-06
Payer: MEDICARE

## 2025-05-07 ENCOUNTER — HOME CARE VISIT (OUTPATIENT)
Dept: HOME HEALTH SERVICES | Facility: HOME HEALTHCARE | Age: 70
End: 2025-05-07
Payer: MEDICARE

## 2025-05-07 PROCEDURE — G0299 HHS/HOSPICE OF RN EA 15 MIN: HCPCS

## 2025-05-09 ENCOUNTER — HOME CARE VISIT (OUTPATIENT)
Dept: HOME HOSPICE | Facility: HOSPICE | Age: 70
End: 2025-05-09
Payer: MEDICARE

## 2025-05-09 ENCOUNTER — HOME CARE VISIT (OUTPATIENT)
Dept: HOME HEALTH SERVICES | Facility: HOME HEALTHCARE | Age: 70
End: 2025-05-09
Payer: MEDICARE

## 2025-05-09 DIAGNOSIS — Z51.5 HOSPICE CARE PATIENT: Primary | ICD-10-CM

## 2025-05-09 PROCEDURE — G0299 HHS/HOSPICE OF RN EA 15 MIN: HCPCS

## 2025-05-10 ENCOUNTER — HOME CARE VISIT (OUTPATIENT)
Dept: HOME HOSPICE | Facility: HOSPICE | Age: 70
End: 2025-05-10
Payer: MEDICARE

## 2025-05-12 ENCOUNTER — HOME CARE VISIT (OUTPATIENT)
Dept: HOME HEALTH SERVICES | Facility: HOME HEALTHCARE | Age: 70
End: 2025-05-12
Payer: MEDICARE

## 2025-05-12 PROCEDURE — G0299 HHS/HOSPICE OF RN EA 15 MIN: HCPCS

## 2025-05-14 ENCOUNTER — HOME CARE VISIT (OUTPATIENT)
Dept: HOME HEALTH SERVICES | Facility: HOME HEALTHCARE | Age: 70
End: 2025-05-14
Payer: MEDICARE

## 2025-05-14 PROCEDURE — 10330063 VN DURABLE MEDICAL EQUIPMENT, SUPPLIES/MEDS

## 2025-05-14 PROCEDURE — G0299 HHS/HOSPICE OF RN EA 15 MIN: HCPCS

## 2025-05-15 ENCOUNTER — HOME CARE VISIT (OUTPATIENT)
Dept: HOME HEALTH SERVICES | Facility: HOME HEALTHCARE | Age: 70
End: 2025-05-15
Payer: MEDICARE

## 2025-05-15 PROCEDURE — G0299 HHS/HOSPICE OF RN EA 15 MIN: HCPCS

## 2025-05-18 ENCOUNTER — HOME CARE VISIT (OUTPATIENT)
Dept: HOME HOSPICE | Facility: HOSPICE | Age: 70
End: 2025-05-18
Payer: MEDICARE

## 2025-05-20 ENCOUNTER — HOME CARE VISIT (OUTPATIENT)
Dept: HOME HOSPICE | Facility: HOSPICE | Age: 70
End: 2025-05-20
Payer: MEDICARE

## 2025-05-20 ENCOUNTER — HOME CARE VISIT (OUTPATIENT)
Dept: HOME HEALTH SERVICES | Facility: HOME HEALTHCARE | Age: 70
End: 2025-05-20
Payer: MEDICARE

## 2025-05-20 DIAGNOSIS — Z51.5 HOSPICE CARE PATIENT: ICD-10-CM

## 2025-05-20 PROCEDURE — G0299 HHS/HOSPICE OF RN EA 15 MIN: HCPCS

## 2025-05-20 NOTE — PROGRESS NOTES
5/20/2025 9:21 AM  [facility] home patient requests medication adjusted due to change in patient condition.  Increased pain, adjusted CADD dosing. Filled electronically via Epic as per [place] State Law.    Requested Prescriptions     Signed Prescriptions Disp Refills    HYDROmorphone (Dilaudid) 1 mg/mL 100 mL 0     Sig: Inject 0.2 mL (0.2 mg total) under the skin continuous Route of Administration: Subcutaneous;  Basal Rate: 0.2 mg/hr;  Bolus Dose: 0.2 mg;  Bolus Interval: 15 minutes;  Max Bolus Doses/hr: 4       TIMMY Gatica  [facility]  Hospice Answering Service: [phone]

## 2025-05-23 ENCOUNTER — HOME CARE VISIT (OUTPATIENT)
Dept: HOME HEALTH SERVICES | Facility: HOME HEALTHCARE | Age: 70
End: 2025-05-23
Payer: MEDICARE

## 2025-05-23 PROCEDURE — G0299 HHS/HOSPICE OF RN EA 15 MIN: HCPCS

## 2025-05-27 PROCEDURE — 10330063 VN DURABLE MEDICAL EQUIPMENT, SUPPLIES/MEDS

## 2025-05-28 ENCOUNTER — HOME CARE VISIT (OUTPATIENT)
Dept: HOME HOSPICE | Facility: HOSPICE | Age: 70
End: 2025-05-28
Payer: MEDICARE

## 2025-05-28 ENCOUNTER — HOME CARE VISIT (OUTPATIENT)
Dept: HOME HEALTH SERVICES | Facility: HOME HEALTHCARE | Age: 70
End: 2025-05-28
Payer: MEDICARE

## 2025-05-28 DIAGNOSIS — Z51.5 HOSPICE CARE PATIENT: ICD-10-CM

## 2025-05-28 PROCEDURE — G0299 HHS/HOSPICE OF RN EA 15 MIN: HCPCS

## 2025-05-30 ENCOUNTER — HOME CARE VISIT (OUTPATIENT)
Dept: HOME HEALTH SERVICES | Facility: HOME HEALTHCARE | Age: 70
End: 2025-05-30
Payer: MEDICARE

## 2025-05-30 PROCEDURE — G0299 HHS/HOSPICE OF RN EA 15 MIN: HCPCS

## 2025-06-02 PROCEDURE — 10330063 VN DURABLE MEDICAL EQUIPMENT, SUPPLIES/MEDS

## 2025-06-03 ENCOUNTER — HOME CARE VISIT (OUTPATIENT)
Dept: HOME HOSPICE | Facility: HOSPICE | Age: 70
End: 2025-06-03
Payer: MEDICARE

## 2025-06-03 ENCOUNTER — HOME CARE VISIT (OUTPATIENT)
Dept: HOME HEALTH SERVICES | Facility: HOME HEALTHCARE | Age: 70
End: 2025-06-03
Payer: MEDICARE

## 2025-06-03 PROCEDURE — G0299 HHS/HOSPICE OF RN EA 15 MIN: HCPCS

## 2025-06-09 ENCOUNTER — HOME CARE VISIT (OUTPATIENT)
Dept: HOME HEALTH SERVICES | Facility: HOME HEALTHCARE | Age: 70
End: 2025-06-09
Payer: MEDICARE

## 2025-06-09 PROCEDURE — G0299 HHS/HOSPICE OF RN EA 15 MIN: HCPCS

## 2025-06-10 PROCEDURE — 10330063 VN DURABLE MEDICAL EQUIPMENT, SUPPLIES/MEDS

## 2025-06-11 ENCOUNTER — HOME CARE VISIT (OUTPATIENT)
Dept: HOME HOSPICE | Facility: HOSPICE | Age: 70
End: 2025-06-11
Payer: MEDICARE

## 2025-06-11 ENCOUNTER — HOME CARE VISIT (OUTPATIENT)
Dept: HOME HEALTH SERVICES | Facility: HOME HEALTHCARE | Age: 70
End: 2025-06-11
Payer: MEDICARE

## 2025-06-11 PROCEDURE — G0299 HHS/HOSPICE OF RN EA 15 MIN: HCPCS

## 2025-06-21 NOTE — TELEPHONE ENCOUNTER
Reason for Call:     Caller's Name: Jacqueline    Caller's Relationship to Patient: Daughter     Caller's Callback Number: [phone]     Home Health OR Hospice Patient: Hospice    Patient's daughter Jacqueline called stating, she just found patient on the floor and patient is unresponsive. Jacqueline believes he has passed away.     On call provider paged.

## 2025-06-25 ENCOUNTER — HOME CARE VISIT (OUTPATIENT)
Dept: HOME HOSPICE | Facility: HOSPICE | Age: 70
End: 2025-06-25
Payer: MEDICARE

## 2025-06-25 NOTE — CASE COMMUNICATION
Bryant Meza, Bereavement Final IDG 25 (1LR) Due: 25   : 1955  SOC: 25  DOD: 25  Diagnosis: Tongue Cancer w/ Mets  Bryant was a 70 year old  man. His spouse passed away 2 years ago on hospice. He lives with his daughter Jacqueline. Bryant has an estranged son. He worked as a . He enjoyed playing golf, gardening, watching the [place], and playing [employer]. Jacqueline said they are Christian, but was never o ne to go to [place]. Jacqueline called to report her father had a fall and believed he passed. She was home with her children and her  was on his way from work. She declined support services or a lift assist until nurse arrived.    CC: claudine Bellamy made a support call as nurse was delayed. Jacqueline mentioned she usually checks on her father after dinner but didn't as she was trying to take care of her two children (ages 10 and 3). S he went over late and found him on the floor. While voicing some guilt she also shared understanding he may have fallen even if she had checked in on him earlier. Jacqueline expressed gratitude for the call and denied any support needs.    TOD: Jacqueline declined services during pronouncement. She said she was extremely grateful to the hospice team and said Bekah and her father had an amazing bond.    CC: claudine Hussein did reach out to Jacqueline.  Family was coping appropriately.    Call Assignments:  RAMONA Lindsay to assess daughter Jacqueline Nguyễn at . (Due: 25)